# Patient Record
Sex: FEMALE | Race: WHITE | Employment: OTHER | URBAN - METROPOLITAN AREA
[De-identification: names, ages, dates, MRNs, and addresses within clinical notes are randomized per-mention and may not be internally consistent; named-entity substitution may affect disease eponyms.]

---

## 2017-08-25 ENCOUNTER — HOSPITAL ENCOUNTER (OUTPATIENT)
Dept: GENERAL RADIOLOGY | Age: 82
Discharge: HOME | End: 2017-08-25
Attending: INTERNAL MEDICINE
Payer: MEDICARE

## 2017-08-25 DIAGNOSIS — R63.4 WEIGHT LOSS: ICD-10-CM

## 2017-08-25 PROCEDURE — 71020 XR CHEST PA LAT: CPT

## 2022-12-10 ENCOUNTER — APPOINTMENT (OUTPATIENT)
Dept: VASCULAR SURGERY | Age: 87
DRG: 683 | End: 2022-12-10
Attending: INTERNAL MEDICINE
Payer: MEDICARE

## 2022-12-10 ENCOUNTER — APPOINTMENT (OUTPATIENT)
Dept: GENERAL RADIOLOGY | Age: 87
DRG: 683 | End: 2022-12-10
Attending: EMERGENCY MEDICINE
Payer: MEDICARE

## 2022-12-10 ENCOUNTER — HOSPITAL ENCOUNTER (INPATIENT)
Age: 87
LOS: 4 days | Discharge: NURSING, REHAB OR LONG-TERM CARE | DRG: 683 | End: 2022-12-14
Attending: EMERGENCY MEDICINE | Admitting: INTERNAL MEDICINE
Payer: MEDICARE

## 2022-12-10 DIAGNOSIS — J98.01 ACUTE BRONCHOSPASM: ICD-10-CM

## 2022-12-10 DIAGNOSIS — R06.00 DYSPNEA, UNSPECIFIED TYPE: Primary | ICD-10-CM

## 2022-12-10 DIAGNOSIS — R60.0 PEDAL EDEMA: ICD-10-CM

## 2022-12-10 LAB
ALBUMIN SERPL-MCNC: 3 G/DL (ref 3.5–5)
ALBUMIN/GLOB SERPL: 1 (ref 1.1–2.2)
ALP SERPL-CCNC: 45 U/L (ref 45–117)
ALT SERPL-CCNC: 18 U/L (ref 12–78)
ANION GAP SERPL CALC-SCNC: 7 MMOL/L (ref 5–15)
APPEARANCE UR: CLEAR
APPEARANCE UR: CLEAR
AST SERPL-CCNC: 28 U/L (ref 15–37)
B PERT DNA SPEC QL NAA+PROBE: NOT DETECTED
BACTERIA URNS QL MICRO: NEGATIVE /HPF
BASOPHILS # BLD: 0 K/UL (ref 0–0.1)
BASOPHILS NFR BLD: 0 % (ref 0–1)
BILIRUB SERPL-MCNC: 0.5 MG/DL (ref 0.2–1)
BILIRUB UR QL: NEGATIVE
BILIRUB UR QL: NEGATIVE
BNP SERPL-MCNC: 1753 PG/ML
BORDETELLA PARAPERTUSSIS PCR, BORPAR: NOT DETECTED
BUN SERPL-MCNC: 33 MG/DL (ref 6–20)
BUN/CREAT SERPL: 28 (ref 12–20)
C PNEUM DNA SPEC QL NAA+PROBE: NOT DETECTED
CALCIUM SERPL-MCNC: 7.7 MG/DL (ref 8.5–10.1)
CHLORIDE SERPL-SCNC: 106 MMOL/L (ref 97–108)
CO2 SERPL-SCNC: 24 MMOL/L (ref 21–32)
COLOR UR: NORMAL
COLOR UR: NORMAL
COVID-19 RAPID TEST, COVR: NOT DETECTED
CREAT SERPL-MCNC: 1.16 MG/DL (ref 0.55–1.02)
D DIMER PPP FEU-MCNC: 18.72 MG/L FEU (ref 0–0.65)
DIFFERENTIAL METHOD BLD: ABNORMAL
EOSINOPHIL # BLD: 0.1 K/UL (ref 0–0.4)
EOSINOPHIL NFR BLD: 1 % (ref 0–7)
EPITH CASTS URNS QL MICRO: NORMAL /LPF
ERYTHROCYTE [DISTWIDTH] IN BLOOD BY AUTOMATED COUNT: 13.9 % (ref 11.5–14.5)
FLUAV SUBTYP SPEC NAA+PROBE: NOT DETECTED
FLUBV RNA SPEC QL NAA+PROBE: NOT DETECTED
FOLATE SERPL-MCNC: 19.5 NG/ML (ref 5–21)
GLOBULIN SER CALC-MCNC: 3 G/DL (ref 2–4)
GLUCOSE SERPL-MCNC: 79 MG/DL (ref 65–100)
GLUCOSE UR STRIP.AUTO-MCNC: NEGATIVE MG/DL
GLUCOSE UR STRIP.AUTO-MCNC: NEGATIVE MG/DL
HADV DNA SPEC QL NAA+PROBE: NOT DETECTED
HCOV 229E RNA SPEC QL NAA+PROBE: NOT DETECTED
HCOV HKU1 RNA SPEC QL NAA+PROBE: NOT DETECTED
HCOV NL63 RNA SPEC QL NAA+PROBE: NOT DETECTED
HCOV OC43 RNA SPEC QL NAA+PROBE: NOT DETECTED
HCT VFR BLD AUTO: 30 % (ref 35–47)
HGB BLD-MCNC: 9.5 G/DL (ref 11.5–16)
HGB UR QL STRIP: NEGATIVE
HGB UR QL STRIP: NEGATIVE
HMPV RNA SPEC QL NAA+PROBE: NOT DETECTED
HPIV1 RNA SPEC QL NAA+PROBE: NOT DETECTED
HPIV2 RNA SPEC QL NAA+PROBE: NOT DETECTED
HPIV3 RNA SPEC QL NAA+PROBE: NOT DETECTED
HPIV4 RNA SPEC QL NAA+PROBE: NOT DETECTED
HYALINE CASTS URNS QL MICRO: NORMAL /LPF (ref 0–2)
IMM GRANULOCYTES # BLD AUTO: 0 K/UL (ref 0–0.04)
IMM GRANULOCYTES NFR BLD AUTO: 1 % (ref 0–0.5)
KETONES UR QL STRIP.AUTO: NEGATIVE MG/DL
KETONES UR QL STRIP.AUTO: NEGATIVE MG/DL
LACTATE BLD-SCNC: 1.22 MMOL/L (ref 0.4–2)
LEUKOCYTE ESTERASE UR QL STRIP.AUTO: NEGATIVE
LEUKOCYTE ESTERASE UR QL STRIP.AUTO: NEGATIVE
LYMPHOCYTES # BLD: 1.7 K/UL (ref 0.8–3.5)
LYMPHOCYTES NFR BLD: 23 % (ref 12–49)
M PNEUMO DNA SPEC QL NAA+PROBE: NOT DETECTED
MCH RBC QN AUTO: 32.4 PG (ref 26–34)
MCHC RBC AUTO-ENTMCNC: 31.7 G/DL (ref 30–36.5)
MCV RBC AUTO: 102.4 FL (ref 80–99)
MONOCYTES # BLD: 0.8 K/UL (ref 0–1)
MONOCYTES NFR BLD: 10 % (ref 5–13)
NEUTS SEG # BLD: 4.9 K/UL (ref 1.8–8)
NEUTS SEG NFR BLD: 65 % (ref 32–75)
NITRITE UR QL STRIP.AUTO: NEGATIVE
NITRITE UR QL STRIP.AUTO: NEGATIVE
NRBC # BLD: 0 K/UL (ref 0–0.01)
NRBC BLD-RTO: 0 PER 100 WBC
PH UR STRIP: 5.5 (ref 5–8)
PH UR STRIP: 6.5 (ref 5–8)
PLATELET # BLD AUTO: 161 K/UL (ref 150–400)
PMV BLD AUTO: 12.2 FL (ref 8.9–12.9)
POTASSIUM SERPL-SCNC: 4.1 MMOL/L (ref 3.5–5.1)
PROT SERPL-MCNC: 6 G/DL (ref 6.4–8.2)
PROT UR STRIP-MCNC: NEGATIVE MG/DL
PROT UR STRIP-MCNC: NEGATIVE MG/DL
RBC # BLD AUTO: 2.93 M/UL (ref 3.8–5.2)
RBC #/AREA URNS HPF: NORMAL /HPF (ref 0–5)
RSV RNA SPEC QL NAA+PROBE: NOT DETECTED
RV+EV RNA SPEC QL NAA+PROBE: NOT DETECTED
SARS-COV-2 PCR, COVPCR: NOT DETECTED
SODIUM SERPL-SCNC: 137 MMOL/L (ref 136–145)
SOURCE, COVRS: NORMAL
SP GR UR REFRACTOMETRY: 1.01 (ref 1–1.03)
SP GR UR REFRACTOMETRY: 1.02 (ref 1–1.03)
TROPONIN-HIGH SENSITIVITY: 18 NG/L (ref 0–51)
TSH SERPL DL<=0.05 MIU/L-ACNC: 1.2 UIU/ML (ref 0.36–3.74)
UA: UC IF INDICATED,UAUC: NORMAL
UR CULT HOLD, URHOLD: NORMAL
UROBILINOGEN UR QL STRIP.AUTO: 0.2 EU/DL (ref 0.2–1)
UROBILINOGEN UR QL STRIP.AUTO: 0.2 EU/DL (ref 0.2–1)
VIT B12 SERPL-MCNC: 291 PG/ML (ref 193–986)
WBC # BLD AUTO: 7.5 K/UL (ref 3.6–11)
WBC URNS QL MICRO: NORMAL /HPF (ref 0–4)

## 2022-12-10 PROCEDURE — 96375 TX/PRO/DX INJ NEW DRUG ADDON: CPT

## 2022-12-10 PROCEDURE — 74011250636 HC RX REV CODE- 250/636: Performed by: INTERNAL MEDICINE

## 2022-12-10 PROCEDURE — 96365 THER/PROPH/DIAG IV INF INIT: CPT

## 2022-12-10 PROCEDURE — 71045 X-RAY EXAM CHEST 1 VIEW: CPT

## 2022-12-10 PROCEDURE — 94640 AIRWAY INHALATION TREATMENT: CPT

## 2022-12-10 PROCEDURE — 74011000250 HC RX REV CODE- 250

## 2022-12-10 PROCEDURE — 74011250636 HC RX REV CODE- 250/636: Performed by: EMERGENCY MEDICINE

## 2022-12-10 PROCEDURE — 84484 ASSAY OF TROPONIN QUANT: CPT

## 2022-12-10 PROCEDURE — 74011250637 HC RX REV CODE- 250/637: Performed by: HOSPITALIST

## 2022-12-10 PROCEDURE — 82607 VITAMIN B-12: CPT

## 2022-12-10 PROCEDURE — 74011000250 HC RX REV CODE- 250: Performed by: INTERNAL MEDICINE

## 2022-12-10 PROCEDURE — 84443 ASSAY THYROID STIM HORMONE: CPT

## 2022-12-10 PROCEDURE — 94664 DEMO&/EVAL PT USE INHALER: CPT

## 2022-12-10 PROCEDURE — 80053 COMPREHEN METABOLIC PANEL: CPT

## 2022-12-10 PROCEDURE — 85379 FIBRIN DEGRADATION QUANT: CPT

## 2022-12-10 PROCEDURE — 93005 ELECTROCARDIOGRAM TRACING: CPT

## 2022-12-10 PROCEDURE — 81003 URINALYSIS AUTO W/O SCOPE: CPT

## 2022-12-10 PROCEDURE — 65270000029 HC RM PRIVATE

## 2022-12-10 PROCEDURE — 83605 ASSAY OF LACTIC ACID: CPT

## 2022-12-10 PROCEDURE — 82746 ASSAY OF FOLIC ACID SERUM: CPT

## 2022-12-10 PROCEDURE — 99285 EMERGENCY DEPT VISIT HI MDM: CPT

## 2022-12-10 PROCEDURE — 74011250637 HC RX REV CODE- 250/637: Performed by: INTERNAL MEDICINE

## 2022-12-10 PROCEDURE — 93970 EXTREMITY STUDY: CPT

## 2022-12-10 PROCEDURE — 87635 SARS-COV-2 COVID-19 AMP PRB: CPT

## 2022-12-10 PROCEDURE — 85025 COMPLETE CBC W/AUTO DIFF WBC: CPT

## 2022-12-10 PROCEDURE — 36415 COLL VENOUS BLD VENIPUNCTURE: CPT

## 2022-12-10 PROCEDURE — 83880 ASSAY OF NATRIURETIC PEPTIDE: CPT

## 2022-12-10 PROCEDURE — 81001 URINALYSIS AUTO W/SCOPE: CPT

## 2022-12-10 PROCEDURE — 0202U NFCT DS 22 TRGT SARS-COV-2: CPT

## 2022-12-10 PROCEDURE — 51798 US URINE CAPACITY MEASURE: CPT

## 2022-12-10 RX ORDER — SODIUM CHLORIDE 0.9 % (FLUSH) 0.9 %
5-40 SYRINGE (ML) INJECTION EVERY 8 HOURS
Status: DISCONTINUED | OUTPATIENT
Start: 2022-12-10 | End: 2022-12-14 | Stop reason: HOSPADM

## 2022-12-10 RX ORDER — IPRATROPIUM BROMIDE AND ALBUTEROL SULFATE 2.5; .5 MG/3ML; MG/3ML
3 SOLUTION RESPIRATORY (INHALATION)
Status: DISCONTINUED | OUTPATIENT
Start: 2022-12-10 | End: 2022-12-10

## 2022-12-10 RX ORDER — CITALOPRAM 20 MG/1
20 TABLET, FILM COATED ORAL DAILY
Status: DISCONTINUED | OUTPATIENT
Start: 2022-12-11 | End: 2022-12-14 | Stop reason: HOSPADM

## 2022-12-10 RX ORDER — ONDANSETRON 4 MG/1
4 TABLET, ORALLY DISINTEGRATING ORAL
Status: DISCONTINUED | OUTPATIENT
Start: 2022-12-10 | End: 2022-12-14 | Stop reason: HOSPADM

## 2022-12-10 RX ORDER — DONEPEZIL HYDROCHLORIDE 5 MG/1
10 TABLET, FILM COATED ORAL
Status: DISCONTINUED | OUTPATIENT
Start: 2022-12-11 | End: 2022-12-14 | Stop reason: HOSPADM

## 2022-12-10 RX ORDER — FUROSEMIDE 10 MG/ML
40 INJECTION INTRAMUSCULAR; INTRAVENOUS ONCE
Status: COMPLETED | OUTPATIENT
Start: 2022-12-10 | End: 2022-12-10

## 2022-12-10 RX ORDER — MAGNESIUM SULFATE HEPTAHYDRATE 40 MG/ML
2 INJECTION, SOLUTION INTRAVENOUS ONCE
Status: COMPLETED | OUTPATIENT
Start: 2022-12-10 | End: 2022-12-10

## 2022-12-10 RX ORDER — ACETAMINOPHEN 325 MG/1
650 TABLET ORAL
Status: DISCONTINUED | OUTPATIENT
Start: 2022-12-10 | End: 2022-12-14 | Stop reason: HOSPADM

## 2022-12-10 RX ORDER — FUROSEMIDE 10 MG/ML
40 INJECTION INTRAMUSCULAR; INTRAVENOUS DAILY
Status: DISCONTINUED | OUTPATIENT
Start: 2022-12-11 | End: 2022-12-11

## 2022-12-10 RX ORDER — GUAIFENESIN 600 MG/1
1200 TABLET, EXTENDED RELEASE ORAL EVERY 12 HOURS
Status: DISCONTINUED | OUTPATIENT
Start: 2022-12-10 | End: 2022-12-11

## 2022-12-10 RX ORDER — ONDANSETRON 2 MG/ML
4 INJECTION INTRAMUSCULAR; INTRAVENOUS
Status: DISCONTINUED | OUTPATIENT
Start: 2022-12-10 | End: 2022-12-11

## 2022-12-10 RX ORDER — ARFORMOTEROL TARTRATE 15 UG/2ML
15 SOLUTION RESPIRATORY (INHALATION)
Status: DISCONTINUED | OUTPATIENT
Start: 2022-12-10 | End: 2022-12-11

## 2022-12-10 RX ORDER — ALBUTEROL SULFATE 0.83 MG/ML
2.5 SOLUTION RESPIRATORY (INHALATION)
Status: ACTIVE | OUTPATIENT
Start: 2022-12-10 | End: 2022-12-11

## 2022-12-10 RX ORDER — ENOXAPARIN SODIUM 100 MG/ML
30 INJECTION SUBCUTANEOUS DAILY
Status: DISCONTINUED | OUTPATIENT
Start: 2022-12-11 | End: 2022-12-10

## 2022-12-10 RX ORDER — LEVOTHYROXINE SODIUM 100 UG/1
100 TABLET ORAL
Status: DISCONTINUED | OUTPATIENT
Start: 2022-12-11 | End: 2022-12-14 | Stop reason: HOSPADM

## 2022-12-10 RX ORDER — BUDESONIDE 0.5 MG/2ML
500 INHALANT ORAL
Status: DISCONTINUED | OUTPATIENT
Start: 2022-12-10 | End: 2022-12-11

## 2022-12-10 RX ORDER — SODIUM CHLORIDE 0.9 % (FLUSH) 0.9 %
5-40 SYRINGE (ML) INJECTION AS NEEDED
Status: DISCONTINUED | OUTPATIENT
Start: 2022-12-10 | End: 2022-12-14 | Stop reason: HOSPADM

## 2022-12-10 RX ORDER — IPRATROPIUM BROMIDE AND ALBUTEROL SULFATE 2.5; .5 MG/3ML; MG/3ML
3 SOLUTION RESPIRATORY (INHALATION)
Status: DISCONTINUED | OUTPATIENT
Start: 2022-12-11 | End: 2022-12-11

## 2022-12-10 RX ORDER — POLYETHYLENE GLYCOL 3350 17 G/17G
17 POWDER, FOR SOLUTION ORAL DAILY PRN
Status: DISCONTINUED | OUTPATIENT
Start: 2022-12-10 | End: 2022-12-14 | Stop reason: HOSPADM

## 2022-12-10 RX ORDER — ENOXAPARIN SODIUM 100 MG/ML
1 INJECTION SUBCUTANEOUS EVERY 24 HOURS
Status: DISCONTINUED | OUTPATIENT
Start: 2022-12-10 | End: 2022-12-11

## 2022-12-10 RX ORDER — ALPRAZOLAM 0.5 MG/1
0.5 TABLET ORAL
Status: COMPLETED | OUTPATIENT
Start: 2022-12-10 | End: 2022-12-10

## 2022-12-10 RX ORDER — IPRATROPIUM BROMIDE AND ALBUTEROL SULFATE 2.5; .5 MG/3ML; MG/3ML
SOLUTION RESPIRATORY (INHALATION)
Status: COMPLETED
Start: 2022-12-10 | End: 2022-12-10

## 2022-12-10 RX ORDER — ACETAMINOPHEN 650 MG/1
650 SUPPOSITORY RECTAL
Status: DISCONTINUED | OUTPATIENT
Start: 2022-12-10 | End: 2022-12-11

## 2022-12-10 RX ADMIN — MAGNESIUM SULFATE HEPTAHYDRATE 2 G: 40 INJECTION, SOLUTION INTRAVENOUS at 12:52

## 2022-12-10 RX ADMIN — FUROSEMIDE 40 MG: 10 INJECTION, SOLUTION INTRAMUSCULAR; INTRAVENOUS at 13:19

## 2022-12-10 RX ADMIN — METHYLPREDNISOLONE SODIUM SUCCINATE 40 MG: 40 INJECTION, POWDER, FOR SOLUTION INTRAMUSCULAR; INTRAVENOUS at 17:54

## 2022-12-10 RX ADMIN — IPRATROPIUM BROMIDE AND ALBUTEROL SULFATE: .5; 3 SOLUTION RESPIRATORY (INHALATION) at 12:50

## 2022-12-10 RX ADMIN — Medication 10 ML: at 14:00

## 2022-12-10 RX ADMIN — METHYLPREDNISOLONE SODIUM SUCCINATE 60 MG: 125 INJECTION, POWDER, FOR SOLUTION INTRAMUSCULAR; INTRAVENOUS at 12:50

## 2022-12-10 RX ADMIN — BUDESONIDE 500 MCG: 0.5 SUSPENSION RESPIRATORY (INHALATION) at 19:56

## 2022-12-10 RX ADMIN — ENOXAPARIN SODIUM 70 MG: 100 INJECTION SUBCUTANEOUS at 20:20

## 2022-12-10 RX ADMIN — ARFORMOTEROL TARTRATE 15 MCG: 15 SOLUTION RESPIRATORY (INHALATION) at 19:56

## 2022-12-10 RX ADMIN — ALPRAZOLAM 0.5 MG: 0.5 TABLET ORAL at 20:19

## 2022-12-10 RX ADMIN — IPRATROPIUM BROMIDE AND ALBUTEROL SULFATE 3 ML: .5; 3 SOLUTION RESPIRATORY (INHALATION) at 19:56

## 2022-12-10 RX ADMIN — GUAIFENESIN 1200 MG: 600 TABLET, EXTENDED RELEASE ORAL at 20:20

## 2022-12-10 RX ADMIN — GUAIFENESIN 1200 MG: 600 TABLET, EXTENDED RELEASE ORAL at 14:01

## 2022-12-10 NOTE — ED PROVIDER NOTES
80-year-old female presents emergency department chief complaint of increasing shortness of breath and leg swelling, as well as dysuria. Family at bedside states that she was discharged from [facility] December 2 for which she was admitted for UTI. They cannot recall her antibiotic at this time. Patient had started to develop wheezing around this time and she has been on a tapered dose of prednisone. Her prednisone was started approximately December 2 as well. She denies a history of asthma or COPD, she is reporting productive cough, chills, shortness of breath, increased orthopnea, increased bilateral lower leg swelling with \"leaking\". Daughter at bedside states that her mother has been more confused, she attributes some of this to the death of her brother last January. The history is provided by the patient, a relative and medical records. Shortness of Breath  This is a new problem. The current episode started more than 1 week ago. The problem has been gradually worsening. Associated symptoms include cough and leg swelling. Pertinent negatives include no fever, no chest pain and no abdominal pain. Leg Swelling   Pertinent negatives include no back pain.       Past Medical History:   Diagnosis Date    Diverticular disease of colon     Esophagitis     High cholesterol     HTN (hypertension)     IBS (irritable bowel syndrome)     Pneumonia     Psychiatric disorder     Anxiety    Thyroid disease     Hypothyroidism       Past Surgical History:   Procedure Laterality Date    ENDOSCOPY, COLON, DIAGNOSTIC  3/10    Tics        o/w  neg    HX CATARACT REMOVAL      HX CHOLECYSTECTOMY      HX GYN  1985    hysterectomy    HX KNEE REPLACEMENT  8/2012    Right    [place] ERCP W/SPHINCTEROTOMY/PAPILLOTOMY  6/01         Family History:   Problem Relation Age of Onset    Heart Disease Mother     Heart Disease Father     Heart Disease Brother     Heart Disease Brother        Social History     Socioeconomic History Marital status:      Spouse name: Not on file    Number of children: Not on file    Years of education: Not on file    Highest education level: Not on file   Occupational History    Not on file   Tobacco Use    Smoking status: Never    Smokeless tobacco: Never   Substance and Sexual Activity    Alcohol use: No    Drug use: No    Sexual activity: Not on file   Other Topics Concern    Not on file   Social History Narrative    Not on file     Social Determinants of Health     Financial Resource Strain: Not on file   Food Insecurity: Not on file   Transportation Needs: Not on file   Physical Activity: Not on file   Stress: Not on file   Social Connections: Not on file   Intimate Partner Violence: Not on file   Housing Stability: Not on file         ALLERGIES: Seroquel [quetiapine]    Review of Systems   Constitutional:  Positive for activity change, chills and fatigue. Negative for diaphoresis and fever. HENT:  Negative for sinus pain. Respiratory:  Positive for cough, chest tightness and shortness of breath. Cardiovascular:  Positive for leg swelling. Negative for chest pain and palpitations. Gastrointestinal:  Negative for abdominal pain. Genitourinary:  Positive for dysuria. Musculoskeletal:  Negative for back pain. Neurological:  Negative for dizziness, speech difficulty and light-headedness. Psychiatric/Behavioral:  Positive for confusion. All other systems reviewed and are negative. Vitals:    12/10/22 1143   BP: (!) 160/71   Pulse: (!) 55   Resp: 24   Temp: 98.3 °F (36.8 °C)   SpO2: 97%   Weight: 65.8 kg (145 lb)   Height: 5' 5\" (1.651 m)            Physical Exam  Vitals and nursing note reviewed. Constitutional:       Comments: Appears stated age, appears ill and in mild to moderate respiratory distress. Speaking 2-3 word sentences with pursed lip breathing. Patient is a poor historian but able to answer simple questions   HENT:      Head: Normocephalic and atraumatic.    Eyes: Extraocular Movements: Extraocular movements intact. Pupils: Pupils are equal, round, and reactive to light. Cardiovascular:      Rate and Rhythm: Normal rate. Comments: -year-old laterally displaced PMI, regular rate, systolic ejection murmur noted  Pulmonary:      Comments: Respiratory rate, mildly prolonged expiratory phase, patient speaking 2-3 word sentences, diffuse wheezing throughout, rales and rhonchi noted posterior right lower lobe  Abdominal:      General: Bowel sounds are normal.      Palpations: Abdomen is soft. Musculoskeletal:      Cervical back: Normal range of motion and neck supple. Right lower leg: Edema present. Left lower leg: Edema present. Comments: Moderate bilateral pedal edema extending proximately to proximal tib-fib, noted some blistering to left anterior lower tib-fib   Skin:     Coloration: Skin is pale. Neurological:      General: No focal deficit present. MDM  12:38 PM  Patient reports mild improvement after initial DuoNeb, has better lung excursion, continues to have wheezing. Reviewed chest x-ray at bedside, no obvious infiltrate, cardiomegaly is noted, consider congestive heart failure as source of  wheeze    EKG: large amount of artifact, rate 50, , Qtc 419, Poor R wave progression, LAD, no acute changes. CBC showed a white count of 7.5, hemoglobin 9.5, hematocrit 30.0, platelets of 953, CMP is as follows: Sodium 137, potassium 4.1, chloride 106, CO2 24, BUN slightly elevated at 33, creatinine slightly elevated 1.16, BNP is 1753 with a troponin of 18, urinalysis still pending, lactic acid negative    1:08 PM  Patient still appears short of breath and appears more shaky now, most likely secondary to 2 breathing treatments. Has some improvement in bilateral breath sounds although they sound more coarse now than wheezing.       72-year-old female presents emergency department with chief complaint difficulty breathing and wheezing. She was started on a prednisone taper pack as well as at home albuterol inhaler without relief around December 2..  Denies a history of smoking or COPD. She was recently discharged from [facility] after treatment of urinary tract infection. Chest x-ray showed cardiomegaly but was negative for any infiltrate, patient unable to stand for lateral view or to lay supine for CT. Lab work was largely within normal limits except for a slightly elevated BNP. Unknown etiology of very coarse breath sounds, consider a lateral infiltrate that cannot be seen on PA film, or PE due to recent hospitalization. We will hold anticoagulation at this time secondary to patient age and also presentation of viral bronchitis with bronchospasm. May have superimposed congestive heart failure. She was given 2 nebulizer treatments in the emergency department with some improvement of chest excursion and lung sounds. Patient states she started to feel better had improvement in skin color. She was also given magnesium 2 g, as well as Lasix 40 mg and recommend admission. COVID pending    Perfect Serve Consult for Admission  1:09 PM    ED Room Number: ER07/07  Patient Name and age: Bernadette Pina 80 y.o.  female  Working Diagnosis:   1. Dyspnea, unspecified type    2. Acute bronchospasm    3.  Pedal edema        COVID-19 Suspicion:  yes  Sepsis present:  no  Reassessment needed: no  Code Status:  Do Not Resuscitate  Readmission: no  Isolation Requirements:  Other  Recommended Level of Care:  telemetry  Department:[facility] ED - [phone]  Other:          Procedures

## 2022-12-10 NOTE — H&P
Alon Sena [facility] [address]  [address] [facility], [address] [place] [address]  [phone]    Admission History and Physical      NAME:  Jorge Gongora   :   1930   MRN:  [id number]     PCP:  Margarito Mejia MD     Date/Time of service:  12/10/2022  2:20 PM        Subjective:     CHIEF COMPLAINT: Dyspnea    HISTORY OF PRESENT ILLNESS:     Ms. Carey Green is a 80 y.o. female with a past medical history of dementia, hypertension, hyperlipidemia, IBS, thyroid disease, transient A. fib A. fib who is admitted with dyspnea. Ms. Carey Green is oriented to person and place but not time. She can provide some history but is limited; her daughter is at bedside and helps provide further information. Patient is admitted to [facility] at the end of November and has been home for the last 8 days. She has continued to experience dyspnea, cough and wheezing increased swelling and weeping from her legs. She does endorse some dysuria. No fevers or chills, cough or chest pain. No falls or syncope. Allergies   Allergen Reactions    Seroquel [Quetiapine] Other (comments)     agitation       Prior to Admission medications    Medication Sig Start Date End Date Taking? Authorizing Provider   donepeziL (ARICEPT) 10 mg tablet TAKE 1 TABLET EVERY NIGHT 22  Yes Margarito Mejia MD   levothyroxine (SYNTHROID) 112 mcg tablet TAKE 1 TABLET EVERY DAY BEFORE BREAKFAST  Patient taking differently: Take 100 mcg by mouth. 22  Yes Margarito Mejia MD   lisinopriL (PRINIVIL, ZESTRIL) 40 mg tablet TAKE 1 TABLET EVERY DAY 22  Yes Margarito Mejia MD   citalopram (CELEXA) 20 mg tablet TAKE 1 AND 1/2 TABLETS EVERY DAY 22  Yes Margarito Mejia MD   simvastatin (ZOCOR) 20 mg tablet TAKE 1 TABLET EVERY NIGHT 22  Yes Margarito Mejia MD   aspirin delayed-release 81 mg tablet Take  by mouth daily.       Provider, Historical       Past Medical History:   Diagnosis Date Diverticular disease of colon     Esophagitis     High cholesterol     HTN (hypertension)     IBS (irritable bowel syndrome)     Pneumonia     Psychiatric disorder     Anxiety    Thyroid disease     Hypothyroidism        Past Surgical History:   Procedure Laterality Date    ENDOSCOPY, COLON, DIAGNOSTIC  3/10    Tics        o/w  neg    HX CATARACT REMOVAL      HX CHOLECYSTECTOMY      HX GYN  1985    hysterectomy    HX KNEE REPLACEMENT  8/2012    Right    [place] ERCP W/SPHINCTEROTOMY/PAPILLOTOMY  6/01       Social History     Tobacco Use    Smoking status: Never    Smokeless tobacco: Never   Substance Use Topics    Alcohol use: No        Family History   Problem Relation Age of Onset    Heart Disease Mother     Heart Disease Father     Heart Disease Brother     Heart Disease Brother         Review of Systems:  (bold if positive, if negative)    Gen:  Eyes:  ENT:  CVS:  Pulm:  Cough, dyspnea, wheezing  GI:  :  MS:  weakness, swelling,Skin:  Psych:  Endo:  Hem:  Renal:  Neuro:            Objective:      VITALS:    Vital signs reviewed; most recent are:    Visit Vitals  BP (!) 160/71 (BP 1 Location: Left upper arm, BP Patient Position: Sitting)   Pulse (!) 51   Temp 98.3 °F (36.8 °C)   Resp 24   Ht 5' 5\" (1.651 m)   Wt 65.8 kg (145 lb)   SpO2 97%   BMI 24.13 kg/m²     SpO2 Readings from Last 6 Encounters:   12/10/22 97%   08/23/12 100%   08/08/12 98%        No intake or output data in the 24 hours ending 12/10/22 1420     Exam:     Physical Exam:    Gen: elderly, in no acute distress  HEENT:  Pink conjunctivae, PERRL, hearing intact to voice  Resp: Some accessory muscle use, rhonchi and rales bilaterally  Card:  RRR, No murmurs, normal S1, S2, bilateral lower extremity peripheral edema  Abd:  Soft, non-tender, non-distended, normoactive bowel sounds are present  Musc:  No cyanosis or clubbing  Skin:  No rashes or ulcers, skin turgor is good  Neuro:  Cranial nerves 3-12 are grossly intact, diffuse weakness, follows commands appropriately  Psych:  Oriented to person, place, but not time, poor insight      Labs:    Recent Labs     12/10/22  1150   WBC 7.5   HGB 9.5*   HCT 30.0*        Recent Labs     12/10/22  1150      K 4.1      CO2 24   GLU 79   BUN 33*   CREA 1.16*   CA 7.7*   ALB 3.0*   TBILI 0.5   ALT 18     No results found for: GLUCPOC  No results for input(s): PH, PCO2, PO2, HCO3, FIO2 in the last 72 hours. No results for input(s): INR, INREXT in the last 72 hours. Radiology and EKG reviewed:   Chest x-ray with no acute concerns     Old Records reviewed in [facility]       Assessment/Plan:       Dyspnea/wheezing POA: Concern for volume overload, respiratory virus or PE. Check respiratory viral panel. proBNP elevated on admission. Check echo; prior echo with systolic function on lower end however this was in 2012. Check D-dimer. Status post IV Lasix in the emergency room; continue as needed. Continue IV steroids for now. Scheduled DuoNebs, Brovana and Pulmicort. Scheduled Mucinex. Incentive spirometry. DEBORAH versus CKD POA: Unclear baseline. Check UA. Monitor renal function with IV Lasix. Hold ACE for now. Lower extremity swelling POA: Check D-dimer and lower extremity Dopplers. ** dopplers positive for LLE dvt. Start dose adjusted lovenox due to renal function     Dysuria/recent UTI POA: Check UA. Hypothyroidism: Check TSH. Continue Synthroid. Hyperlipidemia: Hold home statin. Dementia/depression: Resume home Aricept and Celexa.     Risk of deterioration: high      Total time spent with patient: 48 Minutes **I personally saw and examined the patient during this time period**                 Care Plan discussed with: Patient, Family, and Nursing Staff    Discussed:  Code Status and Care Plan    Prophylaxis:  Lovenox    Probable Disposition:  Home w/Family           ___________________________________________________    Attending Physician: Kris Murphy DO

## 2022-12-11 ENCOUNTER — APPOINTMENT (OUTPATIENT)
Dept: NON INVASIVE DIAGNOSTICS | Age: 87
DRG: 683 | End: 2022-12-11
Attending: INTERNAL MEDICINE
Payer: MEDICARE

## 2022-12-11 PROBLEM — K58.9 IBS (IRRITABLE BOWEL SYNDROME): Status: ACTIVE | Noted: 2022-12-11

## 2022-12-11 PROBLEM — E86.0 DEHYDRATION: Status: ACTIVE | Noted: 2022-12-11

## 2022-12-11 PROBLEM — R79.89 ELEVATED D-DIMER: Status: ACTIVE | Noted: 2022-12-11

## 2022-12-11 PROBLEM — F41.9 ANXIETY AND DEPRESSION: Status: ACTIVE | Noted: 2022-12-11

## 2022-12-11 PROBLEM — I82.409 ACUTE DEEP VENOUS THROMBOSIS (HCC): Status: ACTIVE | Noted: 2022-12-11

## 2022-12-11 PROBLEM — D69.6 THROMBOCYTOPENIA (HCC): Status: ACTIVE | Noted: 2022-12-11

## 2022-12-11 PROBLEM — N17.9 AKI (ACUTE KIDNEY INJURY) (HCC): Status: ACTIVE | Noted: 2022-12-11

## 2022-12-11 PROBLEM — F32.A ANXIETY AND DEPRESSION: Status: ACTIVE | Noted: 2022-12-11

## 2022-12-11 PROBLEM — D64.9 ANEMIA: Status: ACTIVE | Noted: 2022-12-11

## 2022-12-11 LAB
ANION GAP SERPL CALC-SCNC: 9 MMOL/L (ref 5–15)
BASOPHILS # BLD: 0 K/UL (ref 0–0.1)
BASOPHILS NFR BLD: 0 % (ref 0–1)
BUN SERPL-MCNC: 36 MG/DL (ref 6–20)
BUN/CREAT SERPL: 33 (ref 12–20)
CALCIUM SERPL-MCNC: 7.7 MG/DL (ref 8.5–10.1)
CHLORIDE SERPL-SCNC: 103 MMOL/L (ref 97–108)
CHOLEST SERPL-MCNC: 238 MG/DL
CO2 SERPL-SCNC: 25 MMOL/L (ref 21–32)
CREAT SERPL-MCNC: 1.09 MG/DL (ref 0.55–1.02)
DIFFERENTIAL METHOD BLD: ABNORMAL
ECHO AO ASC DIAM: 3.5 CM
ECHO AO ASCENDING AORTA INDEX: 2.02 CM/M2
ECHO AR MAX VEL PISA: 3.3 M/S
ECHO AV AREA PEAK VELOCITY: 1.6 CM2
ECHO AV AREA VTI: 2 CM2
ECHO AV AREA/BSA PEAK VELOCITY: 0.9 CM2/M2
ECHO AV AREA/BSA VTI: 1.2 CM2/M2
ECHO AV MEAN GRADIENT: 6 MMHG
ECHO AV MEAN VELOCITY: 1.2 M/S
ECHO AV PEAK GRADIENT: 13 MMHG
ECHO AV PEAK VELOCITY: 1.8 M/S
ECHO AV REGURGITANT PHT: 421.7 MILLISECOND
ECHO AV VELOCITY RATIO: 0.5
ECHO AV VTI: 35.9 CM
ECHO LA DIAMETER INDEX: 2.6 CM/M2
ECHO LA DIAMETER: 4.5 CM
ECHO LA VOL 2C: 50 ML (ref 22–52)
ECHO LA VOL 4C: 52 ML (ref 22–52)
ECHO LA VOL BP: 54 ML (ref 22–52)
ECHO LA VOL/BSA BIPLANE: 31 ML/M2 (ref 16–34)
ECHO LA VOLUME AREA LENGTH: 58 ML
ECHO LA VOLUME INDEX A2C: 29 ML/M2 (ref 16–34)
ECHO LA VOLUME INDEX A4C: 30 ML/M2 (ref 16–34)
ECHO LA VOLUME INDEX AREA LENGTH: 34 ML/M2 (ref 16–34)
ECHO LV E' LATERAL VELOCITY: 10 CM/S
ECHO LV E' SEPTAL VELOCITY: 6 CM/S
ECHO LV EDV A4C: 91 ML
ECHO LV EDV INDEX A4C: 53 ML/M2
ECHO LVOT AREA: 3.1 CM2
ECHO LVOT AV VTI INDEX: 0.63
ECHO LVOT DIAM: 2 CM
ECHO LVOT MEAN GRADIENT: 2 MMHG
ECHO LVOT PEAK GRADIENT: 4 MMHG
ECHO LVOT PEAK VELOCITY: 0.9 M/S
ECHO LVOT STROKE VOLUME INDEX: 41.2 ML/M2
ECHO LVOT SV: 71.3 ML
ECHO LVOT VTI: 22.7 CM
ECHO MV A VELOCITY: 0.82 M/S
ECHO MV E DECELERATION TIME (DT): 338.6 MS
ECHO MV E VELOCITY: 0.49 M/S
ECHO MV E/A RATIO: 0.6
ECHO MV E/E' LATERAL: 4.9
ECHO MV E/E' RATIO (AVERAGED): 6.53
ECHO MV E/E' SEPTAL: 8.17
ECHO MV REGURGITANT PEAK GRADIENT: 71 MMHG
ECHO MV REGURGITANT PEAK VELOCITY: 4.2 M/S
ECHO PV MAX VELOCITY: 0.9 M/S
ECHO PV PEAK GRADIENT: 3 MMHG
ECHO RV INTERNAL DIMENSION: 4.1 CM
ECHO RV TAPSE: 2.2 CM (ref 1.7–?)
ECHO RVOT PEAK GRADIENT: 2 MMHG
ECHO RVOT PEAK VELOCITY: 0.8 M/S
ECHO TV REGURGITANT MAX VELOCITY: 2.67 M/S
ECHO TV REGURGITANT PEAK GRADIENT: 29 MMHG
EOSINOPHIL # BLD: 0 K/UL (ref 0–0.4)
EOSINOPHIL NFR BLD: 0 % (ref 0–7)
ERYTHROCYTE [DISTWIDTH] IN BLOOD BY AUTOMATED COUNT: 13.7 % (ref 11.5–14.5)
FERRITIN SERPL-MCNC: 49 NG/ML (ref 26–388)
GLUCOSE SERPL-MCNC: 258 MG/DL (ref 65–100)
HAPTOGLOB SERPL-MCNC: 162 MG/DL (ref 30–200)
HCT VFR BLD AUTO: 27.5 % (ref 35–47)
HDLC SERPL-MCNC: 97 MG/DL
HDLC SERPL: 2.5 (ref 0–5)
HGB BLD-MCNC: 8.9 G/DL (ref 11.5–16)
IMM GRANULOCYTES # BLD AUTO: 0 K/UL (ref 0–0.04)
IMM GRANULOCYTES NFR BLD AUTO: 1 % (ref 0–0.5)
IRON SATN MFR SERPL: 8 % (ref 20–50)
IRON SERPL-MCNC: 25 UG/DL (ref 35–150)
LDH SERPL L TO P-CCNC: 225 U/L (ref 81–246)
LDLC SERPL CALC-MCNC: 128.4 MG/DL (ref 0–100)
LYMPHOCYTES # BLD: 0.3 K/UL (ref 0.8–3.5)
LYMPHOCYTES NFR BLD: 7 % (ref 12–49)
MAGNESIUM SERPL-MCNC: 2.5 MG/DL (ref 1.6–2.4)
MCH RBC QN AUTO: 32.1 PG (ref 26–34)
MCHC RBC AUTO-ENTMCNC: 32.4 G/DL (ref 30–36.5)
MCV RBC AUTO: 99.3 FL (ref 80–99)
MONOCYTES # BLD: 0 K/UL (ref 0–1)
MONOCYTES NFR BLD: 1 % (ref 5–13)
NEUTS SEG # BLD: 3.3 K/UL (ref 1.8–8)
NEUTS SEG NFR BLD: 91 % (ref 32–75)
NRBC # BLD: 0 K/UL (ref 0–0.01)
NRBC BLD-RTO: 0 PER 100 WBC
PLATELET # BLD AUTO: 130 K/UL (ref 150–400)
PMV BLD AUTO: 12 FL (ref 8.9–12.9)
POTASSIUM SERPL-SCNC: 3.9 MMOL/L (ref 3.5–5.1)
RBC # BLD AUTO: 2.77 M/UL (ref 3.8–5.2)
RBC MORPH BLD: ABNORMAL
RETICS # AUTO: 0.05 M/UL (ref 0.02–0.08)
RETICS/RBC NFR AUTO: 1.6 % (ref 0.7–2.1)
SODIUM SERPL-SCNC: 137 MMOL/L (ref 136–145)
TIBC SERPL-MCNC: 312 UG/DL (ref 250–450)
TRIGL SERPL-MCNC: 63 MG/DL (ref ?–150)
VLDLC SERPL CALC-MCNC: 12.6 MG/DL
WBC # BLD AUTO: 3.6 K/UL (ref 3.6–11)

## 2022-12-11 PROCEDURE — 80048 BASIC METABOLIC PNL TOTAL CA: CPT

## 2022-12-11 PROCEDURE — 65270000029 HC RM PRIVATE

## 2022-12-11 PROCEDURE — 74011250637 HC RX REV CODE- 250/637: Performed by: INTERNAL MEDICINE

## 2022-12-11 PROCEDURE — 93306 TTE W/DOPPLER COMPLETE: CPT

## 2022-12-11 PROCEDURE — 36415 COLL VENOUS BLD VENIPUNCTURE: CPT

## 2022-12-11 PROCEDURE — 74011000250 HC RX REV CODE- 250: Performed by: INTERNAL MEDICINE

## 2022-12-11 PROCEDURE — 83010 ASSAY OF HAPTOGLOBIN QUANT: CPT

## 2022-12-11 PROCEDURE — 93306 TTE W/DOPPLER COMPLETE: CPT | Performed by: INTERNAL MEDICINE

## 2022-12-11 PROCEDURE — 74011250636 HC RX REV CODE- 250/636: Performed by: INTERNAL MEDICINE

## 2022-12-11 PROCEDURE — 85025 COMPLETE CBC W/AUTO DIFF WBC: CPT

## 2022-12-11 PROCEDURE — 80061 LIPID PANEL: CPT

## 2022-12-11 PROCEDURE — 83615 LACTATE (LD) (LDH) ENZYME: CPT

## 2022-12-11 PROCEDURE — 82728 ASSAY OF FERRITIN: CPT

## 2022-12-11 PROCEDURE — 83540 ASSAY OF IRON: CPT

## 2022-12-11 PROCEDURE — 83735 ASSAY OF MAGNESIUM: CPT

## 2022-12-11 PROCEDURE — 51798 US URINE CAPACITY MEASURE: CPT

## 2022-12-11 PROCEDURE — 85045 AUTOMATED RETICULOCYTE COUNT: CPT

## 2022-12-11 RX ORDER — ARFORMOTEROL TARTRATE 15 UG/2ML
15 SOLUTION RESPIRATORY (INHALATION)
Status: DISCONTINUED | OUTPATIENT
Start: 2022-12-11 | End: 2022-12-11

## 2022-12-11 RX ORDER — BUDESONIDE 0.5 MG/2ML
500 INHALANT ORAL
Status: DISCONTINUED | OUTPATIENT
Start: 2022-12-11 | End: 2022-12-11

## 2022-12-11 RX ORDER — IPRATROPIUM BROMIDE AND ALBUTEROL SULFATE 2.5; .5 MG/3ML; MG/3ML
3 SOLUTION RESPIRATORY (INHALATION)
Status: DISCONTINUED | OUTPATIENT
Start: 2022-12-11 | End: 2022-12-14 | Stop reason: HOSPADM

## 2022-12-11 RX ADMIN — LEVOTHYROXINE SODIUM 100 MCG: 0.1 TABLET ORAL at 06:59

## 2022-12-11 RX ADMIN — METHYLPREDNISOLONE SODIUM SUCCINATE 40 MG: 40 INJECTION, POWDER, FOR SOLUTION INTRAMUSCULAR; INTRAVENOUS at 00:23

## 2022-12-11 RX ADMIN — DONEPEZIL HYDROCHLORIDE 10 MG: 5 TABLET, FILM COATED ORAL at 21:15

## 2022-12-11 RX ADMIN — Medication 10 ML: at 00:23

## 2022-12-11 RX ADMIN — Medication 10 ML: at 07:00

## 2022-12-11 RX ADMIN — CITALOPRAM HYDROBROMIDE 20 MG: 20 TABLET ORAL at 08:31

## 2022-12-11 RX ADMIN — GUAIFENESIN 1200 MG: 600 TABLET, EXTENDED RELEASE ORAL at 08:31

## 2022-12-11 RX ADMIN — APIXABAN 2.5 MG: 2.5 TABLET, FILM COATED ORAL at 17:00

## 2022-12-11 RX ADMIN — Medication 10 ML: at 21:17

## 2022-12-11 RX ADMIN — FUROSEMIDE 40 MG: 10 INJECTION, SOLUTION INTRAMUSCULAR; INTRAVENOUS at 08:31

## 2022-12-11 RX ADMIN — METHYLPREDNISOLONE SODIUM SUCCINATE 40 MG: 40 INJECTION, POWDER, FOR SOLUTION INTRAMUSCULAR; INTRAVENOUS at 06:59

## 2022-12-11 RX ADMIN — ACETAMINOPHEN 650 MG: 325 TABLET, FILM COATED ORAL at 21:15

## 2022-12-11 RX ADMIN — Medication 10 ML: at 17:00

## 2022-12-11 NOTE — PROGRESS NOTES
1930 Patient received in room 402, states she is afraid being alone in the room. Patient is moved to 417 close to nurses station. Patient reassured. Doors kept open. 2000 Patient is still anxious and agitated. Crying and shaking and verbalized she is afraid of being alone in the room still. Patient reassured again and kept her doors and blinds open. Sent a message to On call Doctor on duty via perfect serv. Late entry  At 2030 patient passed urine 100ml. No complaints of pain. At 0000 Patient stable and not in distress. verbalized she wants to pee. Passed 350 ml urine, bladder scan done noted 518 ml in the bladder. at [address] on [address] Oly Medeiros, to insert lockhart catheter. At 0100 Lockhart catheter inserted   At 0200 patient noted calm and sleeping, kept undisturbed. At 0600 patient calm, not in distress. At 0700 Handed over accordingly accordingly. Bedside shift change report given to Rukhsana Gant (oncoming nurse) by Jarocho Lawrence (offgoing nurse). Report included the following information SBAR, Kardex, Intake/Output, MAR, and Recent Results.

## 2022-12-11 NOTE — PROGRESS NOTES
12/11/22  1:48 PM    Care Management Initial Evaluation:    Reason for Admission:     1. Dyspnea, unspecified type    2. Acute bronchospasm    3. Pedal edema                       RUR Score:    16%  Level: Moderate              PCP: First and Last name:   Rashad Stover MD     Name of Practice:    Are you a current patient: Yes/No: Yes   Approximate date of last visit: March 2022   Can you participate in a virtual visit if needed:     Do you (patient/family) have any concerns for transition/discharge? Patient is not safe to live alone, has been staying at her son and DIL's home since being released from [facility] on 11/20/22. They have 13 interior steps they have to take her up and down to get to the living spaces and it has not been going well as she is weak. Her insurance denied IPR while at [facility] and the family appealed but in the meantime she was discharged and she could not then try to get authorization to go to SNF. Family feels that she would benefit greatly from rehab before going either home or back to their home where they could then care for her safely if she is not as weak as she currently is. Plan for utilizing home health:  No home health history     Current Advanced Directive/Advance Care Plan:  Full Code      Healthcare Decision Maker:   Click here to complete [address] including selection of the Healthcare Decision Maker Relationship (ie \"Primary\")   Jenn Brooke: Son- [phone]         Transition of Care Plan:        Patient had been living alone and managing until a recent hospital stay from a UTI. Her home is one level with 1 or 2 entry steps. She was discharged and she started staying with her son and daughter in law. It has been very challenging for them as she is weak and they are having a hard time managing her care at home. They prefer rehab at dc if PT and OT agree and would like preliminary referrals to [facility] and [facility].  They hope to be able to get her strong enough to bring her back home after rehab. Patient has a RW- two one with two wheels and one with four wheels    1),. Patient admitted for emergent care  2). PT/OT consulted  3). Transportation- family  4). CM following for dc needs- preliminary referrals to [facility] and [address] [place] [address] Management Interventions  PCP Verified by CM: Yes (March 2022)  Mode of Transport at Discharge:  Other (see comment) (son will transport at [email or web address]  Transition of Care Consult (CM Consult): Discharge Planning  Discharge Durable Medical Equipment: No  Physical Therapy Consult: Yes  Occupational Therapy Consult: Yes  Speech Therapy Consult: No  Support Systems: Child(edilia), Other Family Member(s)  Confirm Follow Up Transport: Family  The Patient and/or Patient Representative was Provided with a Choice of Provider and Agrees with the Discharge Plan?: Yes  Name of the Patient Representative Who was Provided with a Choice of Provider and Agrees with the Discharge Plan: son and patient  Freedom of Choice List was Provided with Basic Dialogue that Supports the Patient's Individualized Plan of Care/Goals, Treatment Preferences and Shares the Quality Data Associated with the Providers?: Yes  Discharge Location  Patient Expects to be Discharged to[de-identified] Skilled nursing facility

## 2022-12-11 NOTE — PROGRESS NOTES
Problem: Falls - Risk of  Goal: *Absence of Falls  Description: Document Sofia Fothergill Fall Risk and appropriate interventions in the flowsheet. Outcome: Progressing Towards Goal  Note: Fall Risk Interventions:  Mobility Interventions: Bed/chair exit alarm, OT consult for ADLs, Patient to call before getting OOB, PT Consult for mobility concerns, PT Consult for assist device competence, Utilize walker, cane, or other assistive device, Utilize gait belt for transfers/ambulation    Mentation Interventions: Adequate sleep, hydration, pain control, Bed/chair exit alarm, Door open when patient unattended, Evaluate medications/consider consulting pharmacy, Increase mobility, More frequent rounding, Reorient patient, Room close to nurse's station    Medication Interventions: Bed/chair exit alarm, Evaluate medications/consider consulting pharmacy, Patient to call before getting OOB, Teach patient to arise slowly, Utilize gait belt for transfers/ambulation    Elimination Interventions: Bed/chair exit alarm, Call light in reach, Patient to call for help with toileting needs, Toileting schedule/hourly rounds              Problem: Patient Education: Go to Patient Education Activity  Goal: Patient/Family Education  Outcome: Progressing Towards Goal     Problem: Pressure Injury - Risk of  Goal: *Prevention of pressure injury  Description: Document Roberto Scale and appropriate interventions in the flowsheet. Outcome: Progressing Towards Goal  Note: Pressure Injury Interventions:  Sensory Interventions: Assess changes in LOC, Float heels, Keep linens dry and wrinkle-free, Maintain/enhance activity level, Minimize linen layers, Monitor skin under medical devices, Turn and reposition approx.  every two hours (pillows and wedges if needed)    Moisture Interventions: Absorbent underpads, Internal/External urinary devices, Check for incontinence Q2 hours and as needed, Offer toileting Q_hr, Minimize layers    Activity Interventions: Increase time out of bed, PT/OT evaluation    Mobility Interventions: HOB 30 degrees or less, PT/OT evaluation, Turn and reposition approx.  every two hours(pillow and wedges), Assess need for specialty bed    Nutrition Interventions: Document food/fluid/supplement intake                     Problem: Patient Education: Go to Patient Education Activity  Goal: Patient/Family Education  Outcome: Progressing Towards Goal

## 2022-12-11 NOTE — PROGRESS NOTES
[facility]  [address], [facility]  [phone]    Hospitalist Progress Note      NAME: Tamara Arana   :  1930  MRM:  [id number]    Date/Time of service 2022  12:26 AM          Assessment and Plan:     Dyspnea - POA, unclear etiology. DDX URI, anemia, debility, anxiety, PE. No SIRS. No hypoxia. Clear lungs on xray. Negative RVP, Flu and COVID. Check oxygen requirement with PT      DEBORAH (acute kidney injury) / Dehydration - POA presume due to poor PO intake. Hydrate. Acute deep venous thrombosis / Elevated d-dimer - POA, new, no hemodynamic compromise or hypoxia. US finds one small DVT in L peroneal vein. VQ scan to check lungs. If low prob, I would not recommend aggressive treatment. Otherwise low dose eliquis. Debilitated patient - Worse since admission weeks ago. Needs HH PT OT. CM consulted. Anemia / Thrombocytopenia - Check serologies and hemoccult. HTN (hypertension) - BP low normal. Stop lisinopril    Hypothyroid - TSH normal. Continue synthroid    Dementia / Anxiety and depression / IBS (irritable bowel syndrome) - POA, stable. Continue donepazil and citalopram       Subjective:     Chief Complaint:  weak    ROS:  (bold if positive, if negative)    SOB/DOETolerating some PT  Tolerating Diet        Objective:     Last 24hrs VS reviewed since prior progress note.  Most recent are:    Visit Vitals  BP (!) 167/65   Pulse (!) 57   Temp 97.4 °F (36.3 °C)   Resp 18   Ht 5' 5\" (1.651 m)   Wt 65.8 kg (145 lb)   SpO2 94%   BMI 24.13 kg/m²     SpO2 Readings from Last 6 Encounters:   22 94%   12 100%   12 98%          Intake/Output Summary (Last 24 hours) at 2022 0918  Last data filed at 2022 0558  Gross per 24 hour   Intake 360 ml   Output 4769 ml   Net -4409 ml        Physical Exam:    Gen:  Well-developed, well-nourished, in no acute distress  HEENT:  Pink conjunctivae, PERRL, hearing intact to voice, moist mucous membranes  Neck:  Supple, without masses, thyroid non-tender  Resp:  No accessory muscle use, clear breath sounds without wheezes rales or rhonchi  Card:  No murmurs, normal S1, S2 without thrills, bruits or peripheral edema  Abd:  Soft, non-tender, non-distended, normoactive bowel sounds are present, no mass  Lymph:  No cervical or inguinal adenopathy  Musc:  No cyanosis or clubbing  Skin:  No rashes or ulcers, skin turgor is good  Neuro:  Cranial nerves are grossly intact, general motor weakness, follows commands   Psych:  Poor insight, oriented to person, place and time, anxiety    Telemetry reviewed:   normal sinus rhythm  __________________________________________________________________  Medications Reviewed: (see below)  Medications:     Current Facility-Administered Medications   Medication Dose Route Frequency    albuterol-ipratropium (DUO-NEB) 2.5 MG-0.5 MG/3 ML  3 mL Nebulization Q6H PRN    sodium chloride (NS) flush 5-40 mL  5-40 mL IntraVENous Q8H    sodium chloride (NS) flush 5-40 mL  5-40 mL IntraVENous PRN    acetaminophen (TYLENOL) tablet 650 mg  650 mg Oral Q6H PRN    polyethylene glycol (MIRALAX) packet 17 g  17 g Oral DAILY PRN    ondansetron (ZOFRAN ODT) tablet 4 mg  4 mg Oral Q8H PRN    citalopram (CELEXA) tablet 20 mg  20 mg Oral DAILY    donepeziL (ARICEPT) tablet 10 mg  10 mg Oral QHS    levothyroxine (SYNTHROID) tablet 100 mcg  100 mcg Oral 6am    enoxaparin (LOVENOX) injection 70 mg  1 mg/kg SubCUTAneous Q24H        Lab Data Reviewed: (see below)  Lab Review:     Recent Labs     12/11/22  0401 12/10/22  1150   WBC 3.6 7.5   HGB 8.9* 9.5*   HCT 27.5* 30.0*   * 161     Recent Labs     12/11/22  0401 12/10/22  1150    137   K 3.9 4.1    106   CO2 25 24   * 79   BUN 36* 33*   CREA 1.09* 1.16*   CA 7.7* 7.7*   MG 2.5*  --    ALB  --  3.0*   TBILI  --  0.5   ALT  --  18     No results found for: GLUCPOC  No results for input(s): PH, PCO2, PO2, HCO3, FIO2 in the last 72 hours. No results for input(s): INR, INREXT in the last 72 hours. All Micro Results       Procedure Component Value Units Date/Time    RESPIRATORY VIRUS PANEL W/COVID-19, PCR [[id number]] Collected: 12/10/22 1355    Order Status: Completed Specimen: Nasopharyngeal Updated: 12/10/22 1816     Adenovirus Not detected        Coronavirus 229E Not detected        Coronavirus HKU1 Not detected        Coronavirus CVNL63 Not detected        Coronavirus OC43 Not detected        SARS-CoV-2, PCR Not detected        Metapneumovirus Not detected        Rhinovirus and Enterovirus Not detected        Influenza A Not detected        Influenza B Not detected        Parainfluenza 1 Not detected        Parainfluenza 2 Not detected        Parainfluenza 3 Not detected        Parainfluenza virus 4 Not detected        RSV by PCR Not detected        B. parapertussis, PCR Not detected        Bordetella pertussis - PCR Not detected        Chlamydophila pneumoniae DNA, QL, PCR Not detected        Mycoplasma pneumoniae DNA, QL, PCR Not detected       COVID-19 RAPID TEST [[id number]] Collected: 12/10/22 1318    Order Status: Completed Specimen: Nasopharyngeal Updated: 12/10/22 1341     Specimen source Nasopharyngeal        COVID-19 rapid test Not detected        Comment: Rapid Abbott ID Now       Rapid NAAT:  The specimen is NEGATIVE for SARS-CoV-2, the novel coronavirus associated with COVID-19. Negative results should be treated as presumptive and, if inconsistent with clinical signs and symptoms or necessary for patient management, should be tested with an alternative molecular assay. Negative results do not preclude SARS-CoV-2 infection and should not be used as the sole basis for patient management decisions. This test has been authorized by the FDA under an Emergency Use Authorization (EUA) for use by authorized laboratories.    Fact sheet for Healthcare Providers:  http://www.yesenia.bimarin/  Fact sheet for Patients: http://www.yesenia.shailesh/       Methodology: Isothermal Nucleic Acid Amplification         URINE CULTURE HOLD SAMPLE [[id number]] Collected: 12/10/22 1255    Order Status: Completed Specimen: Urine Updated: 12/10/22 1258     Urine culture hold       Urine on hold in Microbiology dept for 2 days. If unpreserved urine is submitted, it cannot be used for addtional testing after 24 hours, recollection will be required. Other pertinent lab: none    Total time spent with patient: 30 Minutes I personally reviewed chart, notes, data and current medications in the medical record. I have personally examined and treated the patient at bedside during this period. To assist coordination of care and communication with nursing and staff, this note may be preliminary early in the day, but finalized by end of the day.                  Care Plan discussed with: Patient, Care Manager, Nursing Staff, and >50% of time spent in counseling and coordination of care    Discussed:  Care Plan and D/C Planning    Prophylaxis:  Lovenox and H2B/PPI    Disposition:  Home w/Family           ___________________________________________________    Attending Physician: Kendal Dotson MD

## 2022-12-12 ENCOUNTER — APPOINTMENT (OUTPATIENT)
Dept: NUCLEAR MEDICINE | Age: 87
DRG: 683 | End: 2022-12-12
Attending: INTERNAL MEDICINE
Payer: MEDICARE

## 2022-12-12 ENCOUNTER — APPOINTMENT (OUTPATIENT)
Dept: GENERAL RADIOLOGY | Age: 87
DRG: 683 | End: 2022-12-12
Attending: INTERNAL MEDICINE
Payer: MEDICARE

## 2022-12-12 LAB
ANION GAP SERPL CALC-SCNC: 8 MMOL/L (ref 5–15)
BASOPHILS # BLD: 0 K/UL (ref 0–0.1)
BASOPHILS NFR BLD: 0 % (ref 0–1)
BUN SERPL-MCNC: 44 MG/DL (ref 6–20)
BUN/CREAT SERPL: 35 (ref 12–20)
CALCIUM SERPL-MCNC: 7.8 MG/DL (ref 8.5–10.1)
CHLORIDE SERPL-SCNC: 102 MMOL/L (ref 97–108)
CO2 SERPL-SCNC: 26 MMOL/L (ref 21–32)
CREAT SERPL-MCNC: 1.24 MG/DL (ref 0.55–1.02)
DIFFERENTIAL METHOD BLD: ABNORMAL
EOSINOPHIL # BLD: 0 K/UL (ref 0–0.4)
EOSINOPHIL NFR BLD: 1 % (ref 0–7)
ERYTHROCYTE [DISTWIDTH] IN BLOOD BY AUTOMATED COUNT: 13.8 % (ref 11.5–14.5)
GLUCOSE SERPL-MCNC: 105 MG/DL (ref 65–100)
HCT VFR BLD AUTO: 27.1 % (ref 35–47)
HEMOCCULT STL QL: POSITIVE
HGB BLD-MCNC: 8.8 G/DL (ref 11.5–16)
IMM GRANULOCYTES # BLD AUTO: 0.1 K/UL (ref 0–0.04)
IMM GRANULOCYTES NFR BLD AUTO: 1 % (ref 0–0.5)
LYMPHOCYTES # BLD: 2 K/UL (ref 0.8–3.5)
LYMPHOCYTES NFR BLD: 23 % (ref 12–49)
MAGNESIUM SERPL-MCNC: 2.1 MG/DL (ref 1.6–2.4)
MCH RBC QN AUTO: 31.8 PG (ref 26–34)
MCHC RBC AUTO-ENTMCNC: 32.5 G/DL (ref 30–36.5)
MCV RBC AUTO: 97.8 FL (ref 80–99)
MONOCYTES # BLD: 0.8 K/UL (ref 0–1)
MONOCYTES NFR BLD: 10 % (ref 5–13)
NEUTS SEG # BLD: 5.5 K/UL (ref 1.8–8)
NEUTS SEG NFR BLD: 65 % (ref 32–75)
NRBC # BLD: 0 K/UL (ref 0–0.01)
NRBC BLD-RTO: 0 PER 100 WBC
PLATELET # BLD AUTO: 148 K/UL (ref 150–400)
PMV BLD AUTO: 12.4 FL (ref 8.9–12.9)
POTASSIUM SERPL-SCNC: 3.8 MMOL/L (ref 3.5–5.1)
RBC # BLD AUTO: 2.77 M/UL (ref 3.8–5.2)
SODIUM SERPL-SCNC: 136 MMOL/L (ref 136–145)
WBC # BLD AUTO: 8.4 K/UL (ref 3.6–11)

## 2022-12-12 PROCEDURE — 74011000250 HC RX REV CODE- 250: Performed by: INTERNAL MEDICINE

## 2022-12-12 PROCEDURE — 2709999900 HC NON-CHARGEABLE SUPPLY

## 2022-12-12 PROCEDURE — 80048 BASIC METABOLIC PNL TOTAL CA: CPT

## 2022-12-12 PROCEDURE — 94640 AIRWAY INHALATION TREATMENT: CPT

## 2022-12-12 PROCEDURE — A9540 TC99M MAA: HCPCS

## 2022-12-12 PROCEDURE — 97116 GAIT TRAINING THERAPY: CPT

## 2022-12-12 PROCEDURE — 36415 COLL VENOUS BLD VENIPUNCTURE: CPT

## 2022-12-12 PROCEDURE — 65270000029 HC RM PRIVATE

## 2022-12-12 PROCEDURE — 97162 PT EVAL MOD COMPLEX 30 MIN: CPT

## 2022-12-12 PROCEDURE — 85025 COMPLETE CBC W/AUTO DIFF WBC: CPT

## 2022-12-12 PROCEDURE — 51798 US URINE CAPACITY MEASURE: CPT

## 2022-12-12 PROCEDURE — 74011250637 HC RX REV CODE- 250/637: Performed by: INTERNAL MEDICINE

## 2022-12-12 PROCEDURE — 82272 OCCULT BLD FECES 1-3 TESTS: CPT

## 2022-12-12 PROCEDURE — 83735 ASSAY OF MAGNESIUM: CPT

## 2022-12-12 PROCEDURE — 97165 OT EVAL LOW COMPLEX 30 MIN: CPT

## 2022-12-12 PROCEDURE — 77030038269 HC DRN EXT URIN PURWCK BARD -A

## 2022-12-12 PROCEDURE — 97535 SELF CARE MNGMENT TRAINING: CPT

## 2022-12-12 PROCEDURE — 74018 RADEX ABDOMEN 1 VIEW: CPT

## 2022-12-12 RX ADMIN — IPRATROPIUM BROMIDE AND ALBUTEROL SULFATE 3 ML: .5; 3 SOLUTION RESPIRATORY (INHALATION) at 05:24

## 2022-12-12 RX ADMIN — APIXABAN 2.5 MG: 2.5 TABLET, FILM COATED ORAL at 21:09

## 2022-12-12 RX ADMIN — APIXABAN 2.5 MG: 2.5 TABLET, FILM COATED ORAL at 08:36

## 2022-12-12 RX ADMIN — LEVOTHYROXINE SODIUM 100 MCG: 0.1 TABLET ORAL at 05:10

## 2022-12-12 RX ADMIN — ACETAMINOPHEN 650 MG: 325 TABLET, FILM COATED ORAL at 04:33

## 2022-12-12 RX ADMIN — ACETAMINOPHEN 650 MG: 325 TABLET, FILM COATED ORAL at 21:09

## 2022-12-12 RX ADMIN — Medication 10 ML: at 05:11

## 2022-12-12 RX ADMIN — CITALOPRAM HYDROBROMIDE 20 MG: 20 TABLET ORAL at 08:36

## 2022-12-12 RX ADMIN — Medication 10 ML: at 21:09

## 2022-12-12 RX ADMIN — DONEPEZIL HYDROCHLORIDE 10 MG: 5 TABLET, FILM COATED ORAL at 21:09

## 2022-12-12 NOTE — PROGRESS NOTES
3:16 pm:  CM met with patient's son at bedside to provide update on SNF referrals. Referral to [facility] is still pending. [facility] has accepted and will begin insurance authorization.       Nilsa Marie LCSW

## 2022-12-12 NOTE — PROGRESS NOTES
Bedside shift change report given to [address] [facility] (oncoming nurse) by Cassie Cruz (offgoing nurse). Report included the following information SBAR, Kardex, Procedure Summary, Intake/Output, MAR, Recent Results, and Cardiac Rhythm Sinus manpreet .

## 2022-12-12 NOTE — PROGRESS NOTES
Problem: Falls - Risk of  Goal: *Absence of Falls  Description: Document Lashay Bishop Fall Risk and appropriate interventions in the flowsheet. Outcome: Progressing Towards Goal  Note: Fall Risk Interventions:  Mobility Interventions: Bed/chair exit alarm    Mentation Interventions: Adequate sleep, hydration, pain control, Bed/chair exit alarm, Door open when patient unattended, Increase mobility, More frequent rounding, Reorient patient, Room close to nurse's station, Toileting rounds, Update white board    Medication Interventions: Patient to call before getting OOB, Teach patient to arise slowly    Elimination Interventions: Patient to call for help with toileting needs, Bed/chair exit alarm, Call light in reach, Toileting schedule/hourly rounds              Problem: Patient Education: Go to Patient Education Activity  Goal: Patient/Family Education  Outcome: Progressing Towards Goal     Problem: Pressure Injury - Risk of  Goal: *Prevention of pressure injury  Description: Document Roberto Scale and appropriate interventions in the flowsheet.   Outcome: Progressing Towards Goal  Note: Pressure Injury Interventions:  Sensory Interventions: Assess changes in LOC, Float heels, Keep linens dry and wrinkle-free, Maintain/enhance activity level    Moisture Interventions: Internal/External urinary devices    Activity Interventions: Increase time out of bed, PT/OT evaluation    Mobility Interventions: PT/OT evaluation    Nutrition Interventions: Document food/fluid/supplement intake                     Problem: Patient Education: Go to Patient Education Activity  Goal: Patient/Family Education  Outcome: Progressing Towards Goal

## 2022-12-12 NOTE — PROGRESS NOTES
Problem: Falls - Risk of  Goal: *Absence of Falls  Description: Document Joyce Rivera Fall Risk and appropriate interventions in the flowsheet. Outcome: Progressing Towards Goal  Note: Fall Risk Interventions:  Mobility Interventions: Bed/chair exit alarm    Mentation Interventions: Adequate sleep, hydration, pain control, Bed/chair exit alarm    Medication Interventions: Bed/chair exit alarm    Elimination Interventions: Bed/chair exit alarm              Problem: Patient Education: Go to Patient Education Activity  Goal: Patient/Family Education  Outcome: Progressing Towards Goal     Problem: Pressure Injury - Risk of  Goal: *Prevention of pressure injury  Description: Document Roberto Scale and appropriate interventions in the flowsheet. Outcome: Progressing Towards Goal  Note: Pressure Injury Interventions:  Sensory Interventions: Assess changes in LOC, Float heels, Keep linens dry and wrinkle-free, Maintain/enhance activity level    Moisture Interventions: Limit adult briefs, Maintain skin hydration (lotion/cream), Absorbent underpads    Activity Interventions: Increase time out of bed, PT/OT evaluation    Mobility Interventions: HOB 30 degrees or less, PT/OT evaluation    Nutrition Interventions: Document food/fluid/supplement intake                     Problem: Pressure Injury - Risk of  Goal: *Prevention of pressure injury  Description: Document Roberto Scale and appropriate interventions in the flowsheet.   Outcome: Progressing Towards Goal  Note: Pressure Injury Interventions:  Sensory Interventions: Assess changes in LOC, Float heels, Keep linens dry and wrinkle-free, Maintain/enhance activity level    Moisture Interventions: Limit adult briefs, Maintain skin hydration (lotion/cream), Absorbent underpads    Activity Interventions: Increase time out of bed, PT/OT evaluation    Mobility Interventions: HOB 30 degrees or less, PT/OT evaluation    Nutrition Interventions: Document food/fluid/supplement intake Problem: Patient Education: Go to Patient Education Activity  Goal: Patient/Family Education  Outcome: Progressing Towards Goal

## 2022-12-12 NOTE — PROGRESS NOTES
[facility]  [address], [facility] [address]  [phone]    Hospitalist Progress Note      NAME: Victoriano Meredith   :  1930  MRM:  [id number]    Date/Time of service 2022  12:26 AM          Assessment and Plan:     Dyspnea - POA, unclear etiology, better. DDX URI, anemia, debility, anxiety, PE. No SIRS. No hypoxia. Clear lungs on xray. Negative RVP, Flu and COVID. Check oxygen requirement with PT      DEBORAH (acute kidney injury) / Dehydration - POA presume due to poor PO intake. Hydrated, but it did not improve. May have CKD3. Acute deep venous thrombosis / Elevated d-dimer - POA, new, no hemodynamic compromise or hypoxia. US finds one small DVT in L peroneal vein. VQ scan to check lungs. If low prob, I would not recommend aggressive treatment. Otherwise low dose eliquis. Debilitated patient - Worse since admission weeks ago. Needs HH PT OT vs SNF. CM consulted. Anemia / Thrombocytopenia - Low iron on serologies. Give IV iron now and PO iron at home    HTN (hypertension) - BP low normal. Stopped lisinopril    Hypothyroid - TSH normal. Continue synthroid    Dementia / Anxiety and depression / IBS (irritable bowel syndrome) - POA, stable. Continue donepazil and citalopram       Subjective:     Chief Complaint:  weak    ROS:  (bold if positive, if negative)    SOB/DOETolerating some PT  Tolerating Diet        Objective:     Last 24hrs VS reviewed since prior progress note.  Most recent are:    Visit Vitals  BP (!) 145/71 (BP 1 Location: Left upper arm, BP Patient Position: Semi fowlers)   Pulse 63   Temp 97.3 °F (36.3 °C)   Resp 18   Ht 5' 5\" (1.651 m)   Wt 65.8 kg (145 lb)   SpO2 98%   BMI 24.13 kg/m²     SpO2 Readings from Last 6 Encounters:   22 98%   12 100%   12 98%          Intake/Output Summary (Last 24 hours) at 2022 0801  Last data filed at 2022 0448  Gross per 24 hour   Intake 430 ml   Output 2300 ml   Net -1870 ml Physical Exam:    Gen:  Well-developed, well-nourished, in no acute distress  HEENT:  Pink conjunctivae, PERRL, hearing intact to voice, moist mucous membranes  Neck:  Supple, without masses, thyroid non-tender  Resp:  No accessory muscle use, clear breath sounds without wheezes rales or rhonchi  Card:  No murmurs, normal S1, S2 without thrills, bruits or peripheral edema  Abd:  Soft, non-tender, non-distended, normoactive bowel sounds are present, no mass  Lymph:  No cervical or inguinal adenopathy  Musc:  No cyanosis or clubbing  Skin:  No rashes or ulcers, skin turgor is good  Neuro:  Cranial nerves are grossly intact, general motor weakness, follows commands   Psych:  Poor insight, oriented to person, place and time, anxiety    Telemetry reviewed:   normal sinus rhythm  __________________________________________________________________  Medications Reviewed: (see below)  Medications:     Current Facility-Administered Medications   Medication Dose Route Frequency    albuterol-ipratropium (DUO-NEB) 2.5 MG-0.5 MG/3 ML  3 mL Nebulization Q6H PRN    apixaban (ELIQUIS) tablet 2.5 mg  2.5 mg Oral BID    sodium chloride (NS) flush 5-40 mL  5-40 mL IntraVENous Q8H    sodium chloride (NS) flush 5-40 mL  5-40 mL IntraVENous PRN    acetaminophen (TYLENOL) tablet 650 mg  650 mg Oral Q6H PRN    polyethylene glycol (MIRALAX) packet 17 g  17 g Oral DAILY PRN    ondansetron (ZOFRAN ODT) tablet 4 mg  4 mg Oral Q8H PRN    citalopram (CELEXA) tablet 20 mg  20 mg Oral DAILY    donepeziL (ARICEPT) tablet 10 mg  10 mg Oral QHS    levothyroxine (SYNTHROID) tablet 100 mcg  100 mcg Oral 6am        Lab Data Reviewed: (see below)  Lab Review:     Recent Labs     12/12/22  0025 12/11/22  0401 12/10/22  1150   WBC 8.4 3.6 7.5   HGB 8.8* 8.9* 9.5*   HCT 27.1* 27.5* 30.0*   * 130* 161       Recent Labs     12/12/22  0025 12/11/22  0401 12/10/22  1150    137 137   K 3.8 3.9 4.1    103 106   CO2 26 25 24   * 258* 79   BUN 44* 36* 33*   CREA 1.24* 1.09* 1.16*   CA 7.8* 7.7* 7.7*   MG 2.1 2.5*  --    ALB  --   --  3.0*   TBILI  --   --  0.5   ALT  --   --  18       No results found for: GLUCPOC  No results for input(s): PH, PCO2, PO2, HCO3, FIO2 in the last 72 hours. No results for input(s): INR, INREXT, INREXT in the last 72 hours. All Micro Results       Procedure Component Value Units Date/Time    RESPIRATORY VIRUS PANEL W/COVID-19, PCR [[id number]] Collected: 12/10/22 1355    Order Status: Completed Specimen: Nasopharyngeal Updated: 12/10/22 1816     Adenovirus Not detected        Coronavirus 229E Not detected        Coronavirus HKU1 Not detected        Coronavirus CVNL63 Not detected        Coronavirus OC43 Not detected        SARS-CoV-2, PCR Not detected        Metapneumovirus Not detected        Rhinovirus and Enterovirus Not detected        Influenza A Not detected        Influenza B Not detected        Parainfluenza 1 Not detected        Parainfluenza 2 Not detected        Parainfluenza 3 Not detected        Parainfluenza virus 4 Not detected        RSV by PCR Not detected        B. parapertussis, PCR Not detected        Bordetella pertussis - PCR Not detected        Chlamydophila pneumoniae DNA, QL, PCR Not detected        Mycoplasma pneumoniae DNA, QL, PCR Not detected       COVID-19 RAPID TEST [[id number]] Collected: 12/10/22 1318    Order Status: Completed Specimen: Nasopharyngeal Updated: 12/10/22 1341     Specimen source Nasopharyngeal        COVID-19 rapid test Not detected        Comment: Rapid Abbott ID Now       Rapid NAAT:  The specimen is NEGATIVE for SARS-CoV-2, the novel coronavirus associated with COVID-19. Negative results should be treated as presumptive and, if inconsistent with clinical signs and symptoms or necessary for patient management, should be tested with an alternative molecular assay.   Negative results do not preclude SARS-CoV-2 infection and should not be used as the sole basis for patient management decisions. This test has been authorized by the FDA under an Emergency Use Authorization (EUA) for use by authorized laboratories. Fact sheet for Healthcare Providers:  http://www.yesenia.shailesh/  Fact sheet for Patients: http://www.yesenia.shailesh/       Methodology: Isothermal Nucleic Acid Amplification         URINE CULTURE HOLD SAMPLE [[id number]] Collected: 12/10/22 1255    Order Status: Completed Specimen: Urine Updated: 12/10/22 1258     Urine culture hold       Urine on hold in Microbiology dept for 2 days. If unpreserved urine is submitted, it cannot be used for addtional testing after 24 hours, recollection will be required. Other pertinent lab: none    Total time spent with patient: 30 Minutes I personally reviewed chart, notes, data and current medications in the medical record. I have personally examined and treated the patient at bedside during this period. To assist coordination of care and communication with nursing and staff, this note may be preliminary early in the day, but finalized by end of the day.                  Care Plan discussed with: Patient, Care Manager, Nursing Staff, and >50% of time spent in counseling and coordination of care    Discussed:  Care Plan and D/C Planning    Prophylaxis:  Lovenox and H2B/PPI    Disposition:  Home w/Family           ___________________________________________________    Attending Physician: Lashay Durand MD

## 2022-12-12 NOTE — PROGRESS NOTES
Problem: Mobility Impaired (Adult and Pediatric)  Goal: *Acute Goals and Plan of Care (Insert Text)  Description: FUNCTIONAL STATUS PRIOR TO ADMISSION: Patient is a poor historian, unsure of functional mobility independence level or DME but per chart, family was having to assist patient with mobility and stair climbing recently. HOME SUPPORT PRIOR TO ADMISSION: The patient most recently lived with her son, unsure of level of assistance needed. Physical Therapy Goals  Initiated 12/12/2022  1. Patient will move from supine to sit and sit to supine , scoot up and down, and roll side to side in bed with independence within 7 day(s). 2.  Patient will transfer from bed to chair and chair to bed with supervision/set-up using the least restrictive device within 7 day(s). 3.  Patient will perform sit to stand with supervision/set-up within 7 day(s). 4.  Patient will ambulate with supervision/set-up for 150 feet with the least restrictive device within 7 day(s). 5.  Patient will ascend/descend 12 stairs with 1 handrail(s) with modified independence within 7 day(s). Outcome: Not Met   PHYSICAL THERAPY EVALUATION  Patient: Mike Trujillo (80 y.o. female)  Date: 12/12/2022  Primary Diagnosis: Dyspnea [R06.00]       Precautions:          ASSESSMENT  Based on the objective data described below, the patient presents with generalized weakness, poor short term memory and confusion, impaired dynamic standing balance, decreased activity tolerance, and gait dysfunction likely not fully consistent with baseline functional mobility level s/p admission for dyspnea. Patient oriented only to self and being in a hospital but confused throughout session and repeating the same questions over and over. Very pleasant and cooperative, however. Patient overall needing only CGA for mobility, up to MIN for gait due to never using RW prior to now per patient.   Unsure of baseline level of mobility but reports not using any AD, family indicates difficulty with assisting her upstairs to 2nd floor bedroom. Recommend SNF rehab to maximize strength and endurance to allow improved disposition options with less caregiver assistance. Current Level of Function Impacting Discharge (mobility/balance): CGA to MIN A gait x 15' and transfers. Functional Outcome Measure: The patient scored Total: 55/100 on the Barthel Index outcome measure which is indicative of being partially dependent in basic self-care. Other factors to consider for discharge: family ability to support patient     Patient will benefit from skilled therapy intervention to address the above noted impairments. PLAN :  Recommendations and Planned Interventions: bed mobility training, transfer training, gait training, therapeutic exercises, neuromuscular re-education, patient and family training/education, and therapeutic activities      Frequency/Duration: Patient will be followed by physical therapy:  5 times a week to address goals. Recommendation for discharge: (in order for the patient to meet his/her long term goals)  Therapy up to 5 days/week in SNF setting    This discharge recommendation:  Has been made in collaboration with the attending provider and/or case management    IF patient discharges home will need the following DME: to be determined (TBD), likely RW         SUBJECTIVE:   Patient stated Do my sons know I'm here (asked at least 6 times).     OBJECTIVE DATA SUMMARY:   HISTORY:    Past Medical History:   Diagnosis Date    Anxiety and depression     Diverticular disease of colon     High cholesterol     HTN (hypertension)     Hypothyroid     IBS (irritable bowel syndrome)      Past Surgical History:   Procedure Laterality Date    ENDOSCOPY, COLON, DIAGNOSTIC  3/10    Tics        o/w  neg    HX CATARACT REMOVAL      HX CHOLECYSTECTOMY      HX GYN  1985    hysterectomy    HX KNEE REPLACEMENT  8/2012    Right    [place] ER[place]/SPHINCTEROTOMY/PAPILLOTOMY 6/01       Personal factors and/or comorbidities impacting plan of care:     Home Situation  Home Environment: Private residence  # Steps to Enter: 2  One/Two Story Residence: Two story  Living Alone: No  Support Systems: Child(edilia)  Patient Expects to be Discharged to[de-identified] Skilled nursing facility  Current DME Used/Available at Home:  (unsure)  Tub or Shower Type: Tub/Shower combination    EXAMINATION/PRESENTATION/DECISION MAKING:   Critical Behavior:  Neurologic State: Alert, Confused  Orientation Level: Oriented to person, Disoriented to time, Disoriented to situation (oriented to being in a hospital unsure of name, believes it is July)  Cognition: Follows commands, Poor safety awareness     Hearing: Auditory  Auditory Impairment: None    Range Of Motion:  AROM: Generally decreased, functional                       Strength:    Strength: Generally decreased, functional                    Tone & Sensation:   Tone: Normal              Sensation: Intact               Coordination:  Coordination: Within functional limits  Vision:      Functional Mobility:  Bed Mobility:  Rolling: Stand-by assistance  Supine to Sit: Stand-by assistance     Scooting: Stand-by assistance  Transfers:  Sit to Stand: Contact guard assistance; Additional time  Stand to Sit: Contact guard assistance; Additional time        Bed to Chair: Contact guard assistance; Additional time              Balance:   Sitting: Intact  Standing: Impaired; With support  Standing - Static: Constant support; Fair  Standing - Dynamic : Constant support; Fair  Ambulation/Gait Training:  Distance (ft): 15 Feet (ft) (x 2)  Assistive Device: Gait belt;Walker, rolling  Ambulation - Level of Assistance: Contact guard assistance;Minimal assistance (mild RW management)        Gait Abnormalities: Decreased step clearance;Shuffling gait        Base of Support: Widened     Speed/Lorna: Pace decreased (<100 feet/min)  Step Length: Right shortened;Left shortened Functional Measure:  Barthel Index:    Bathin  Bladder: 5  Bowels: 5  Groomin  Dressin  Feeding: 10  Mobility: 10 (would be able to with rest breaks)  Stairs: 0  Toilet Use: 5  Transfer (Bed to Chair and Back): 10  Total: 55/100       The Barthel ADL Index: Guidelines  1. The index should be used as a record of what a patient does, not as a record of what a patient could do. 2. The main aim is to establish degree of independence from any help, physical or verbal, however minor and for whatever reason. 3. The need for supervision renders the patient not independent. 4. A patient's performance should be established using the best available evidence. Asking the patient, friends/relatives and nurses are the usual sources, but direct observation and common sense are also important. However direct testing is not needed. 5. Usually the patient's performance over the preceding 24-48 hours is important, but occasionally longer periods will be relevant. 6. Middle categories imply that the patient supplies over 50 per cent of the effort. 7. Use of aids to be independent is allowed. Score Interpretation (from [address] [address])    Independent   60-79 Minimally independent   40-59 Partially dependent   20-39 Very dependent   <20 Totally dependent     -Chemo Huggins., Barthel, D.W. (1965). Functional evaluation: the Barthel Index. [address] [facility] ([address] (1997). The Barthel activities of daily living index: self-reporting versus actual performance in the old (> or = 75 years). Journal of [address] [place] [address] 45(7), 14 [facility], J.J.M.F, Froilan Looney., Starla Rendon. (1999). Measuring the change in disability after inpatient rehabilitation; comparison of the responsiveness of the Barthel Index and Functional [place] Measure. Journal of Neurology, Neurosurgery, and Psychiatry, 66(4), 107-286.   CAROL ANN Taylor.EMILIANO, MYNOR Lopez, & Mateo White M.A. (2004) Assessment of post-stroke quality of life in cost-effectiveness studies: The usefulness of the Barthel Index and the EuroQoL-5D. Quality of Life Research, 15, 358-37        Physical Therapy Evaluation Charge Determination   History Examination Presentation Decision-Making   HIGH Complexity :3+ comorbidities / personal factors will impact the outcome/ POC  MEDIUM Complexity : 3 Standardized tests and measures addressing body structure, function, activity limitation and / or participation in recreation  MEDIUM Complexity : Evolving with changing characteristics  Other outcome measures Barthel 55  MEDIUM      Based on the above components, the patient evaluation is determined to be of the following complexity level: MEDIUM    Pain Rating:  None reported    Activity Tolerance:   Fair    After treatment patient left in no apparent distress:   Sitting in chair, Call bell within reach, and Bed / chair alarm activated    COMMUNICATION/EDUCATION:   The patients plan of care was discussed with: Occupational therapist and Registered nurse. Fall prevention education was provided and the patient/caregiver indicated understanding. and Patient/family have participated as able in goal setting and plan of care.     Thank you for this referral.  Suly Sykes, PT   Time Calculation: 30 mins

## 2022-12-12 NOTE — PROGRESS NOTES
Patient complaining of lower abdominal pain. Had a bowel movement and she still states that Her abdomen feels \"funny, like she is passing a stone\". Denies any nausea. Tylenol administered will reassess in one hour.

## 2022-12-12 NOTE — PROGRESS NOTES
Problem: Self Care Deficits Care Plan (Adult)  Goal: *Acute Goals and Plan of Care (Insert Text)  Description: FUNCTIONAL STATUS PRIOR TO ADMISSION: Per chart review and patient report, patient lives alone at baseline (recently moved in with DIL and son) and was independent for self care and functional transfers/mobility at baseline requiring increased assist recently     HOME SUPPORT: Patient lives alone at baseline however recently moved in with son and DIL and they provide assist as needed. Occupational Therapy Goals  Initiated 12/12/2022  1. Patient will perform grooming with supervision/set-up within 7 day(s). 2.  Patient will perform upper body bathing/dressing with supervision/set-up within 7 day(s). 3.  Patient will perform lower body bathing/dressing with supervision/set-up within 7 day(s). 4.  Patient will perform toilet transfers with supervision/set-up within 7 day(s). 5.  Patient will perform all aspects of toileting with supervision/set-up within 7 day(s). 6.  Patient will participate in upper extremity therapeutic exercise/activities with supervision/set-up for 10 minutes within 7 day(s). 7.  Patient will utilize energy conservation techniques during functional activities with verbal cues within 7 day(s). Outcome: Not Met     OCCUPATIONAL THERAPY EVALUATION  Patient: Tamara Arana (80 y.o. female)  Date: 12/12/2022  Primary Diagnosis: Dyspnea [R06.00]       Precautions: Falls       ASSESSMENT  Patient received semi supine in bed A&O to self, type of place (patient unsure of name thus patient reoriented to place and time) and agreeable for OT/PT eval/tx. Pt unsure of home information however stating she is independent for self care and functional transfers/mobility. Per chart review, patient recently staying with son and DIL and patient having difficulty going up stairs.      Pt presents with decreased balance, decreased activity tolerance, decreased safety awareness, generalized weakness and increased need for assist with self care (SBA grooming seated on commode, CGA washing hands at sink, mod A toileting during stance for thoroughness after BM) and functional transfers/mobility (SBA sup->sit and scooting EOB, CGA sit<->stand and toilet transfer with RW and amaury HHA and on way back to recliner use of RW with cueing for safe walker management in bathroom). Patient noted with decreased short term memory asking every few minutes \"what is wrong with me; where am I?; do my son's know where I am?.\" Patient would benefit from continued skilled OT services [facility] at [facility] in order to increase safety and independence with self care and functional transfers/mobility. Recommend discharge to SNF as patient below baseline functional level. Functional Outcome Measure: The patient scored Total: 55/100 on the Barthel Index outcome measure     Other factors to consider for discharge: time since onset, severity of deficits, PLOF        PLAN :  Recommendations and Planned Interventions: self care training, functional mobility training, therapeutic exercise, balance training, therapeutic activities, endurance activities, patient education, and home safety training    Frequency/Duration: Patient will be followed by occupational therapy 5 times a week to address goals.     Recommendation for discharge: (in order for the patient to meet his/her long term goals)  SNF    This discharge recommendation:  Has been made in collaboration with the attending provider and/or case management    IF patient discharges home will need the following DME: TBD       SUBJECTIVE:   Patient stated Do my sons know where I am.    OBJECTIVE DATA SUMMARY:   HISTORY:   Past Medical History:   Diagnosis Date    Anxiety and depression     Diverticular disease of colon     High cholesterol     HTN (hypertension)     Hypothyroid     IBS (irritable bowel syndrome)      Past Surgical History:   Procedure Laterality Date    ENDOSCOPY, COLON, DIAGNOSTIC  3/10    Tics        o/w  neg    HX CATARACT REMOVAL      HX CHOLECYSTECTOMY      HX GYN  1985    hysterectomy    HX KNEE REPLACEMENT  8/2012    Right    [place] ERCP W/SPHINCTEROTOMY/PAPILLOTOMY  6/01       Expanded or extensive additional review of patient history:     Home Situation  Home Environment: Private residence  # Steps to Enter: 2  One/Two Story Residence: Two story  Living Alone: No  Support Systems: Child(edilia)  Patient Expects to be Discharged to[de-identified] Skilled nursing facility  Current DME Used/Available at Home: [employer] Fast or Shower Type: Tub/Shower combination    EXAMINATION OF PERFORMANCE DEFICITS:  Cognitive/Behavioral Status:  Neurologic State: Alert;Confused  Orientation Level: Oriented to person;Disoriented to time;Disoriented to situation (oriented to being in a hospital unsure of name, believes it is July)  Cognition: Follows commands;Poor safety awareness     Hearing: Auditory  Auditory Impairment: None    Range of Motion:  AROM: Generally decreased, functional     Strength:  Strength: Generally decreased, functional     Coordination:  Coordination: (P) Within functional limits       Tone & Sensation:  Tone: (P) Normal  Sensation: (P) Intact     Balance:  Sitting: Intact  Standing: Impaired; With support  Standing - Static: Constant support; Fair  Standing - Dynamic : Constant support; Fair    Functional Mobility and Transfers for ADLs:  Bed Mobility:  Rolling: Stand-by assistance  Supine to Sit: Stand-by assistance  Scooting: Stand-by assistance    Transfers:  Sit to Stand: Contact guard assistance; Additional time  Stand to Sit: Contact guard assistance; Additional time  Bed to Chair: Contact guard assistance; Additional time  Bathroom Mobility: Contact guard assistance;Minimum assistance (cueing for safe walker management)  Toilet Transfer : Contact guard assistance; Additional time  Assistive Device : Gait Belt;Walker, rolling    ADL Assessment:     Oral Facial Hygiene/Grooming: Contact guard assistance (standing sink)    Toileting: Moderate assistance (during stance due to amount of BM and for thoroughness)     ADL Intervention and task modifications:     Grooming  Grooming Assistance: Contact guard assistance  Position Performed: Standing  Washing Face: Stand-by assistance  Washing Hands: Contact guard assistance  Brushing/Combing Hair: Stand-by assistance    Toileting  Toileting Assistance: Moderate assistance  Bowel Hygiene: Moderate assistance  Clothing Management: Contact guard assistance     Functional Measure:    Barthel Index:  Bathin  Bladder: 5  Bowels: 5  Groomin  Dressin  Feeding: 10  Mobility: 10 (would be able to with rest breaks)  Stairs: 0  Toilet Use: 5  Transfer (Bed to Chair and Back): 10  Total: 55/100      The Barthel ADL Index: Guidelines  1. The index should be used as a record of what a patient does, not as a record of what a patient could do. 2. The main aim is to establish degree of independence from any help, physical or verbal, however minor and for whatever reason. 3. The need for supervision renders the patient not independent. 4. A patient's performance should be established using the best available evidence. Asking the patient, friends/relatives and nurses are the usual sources, but direct observation and common sense are also important. However direct testing is not needed. 5. Usually the patient's performance over the preceding 24-48 hours is important, but occasionally longer periods will be relevant. 6. Middle categories imply that the patient supplies over 50 per cent of the effort. 7. Use of aids to be independent is allowed. Score Interpretation (from [address] [facility] [address])    Independent   60-79 Minimally independent   40-59 Partially dependent   20-39 Very dependent   <20 Totally dependent     -Chemo Huggins., Barthel, D.W. (1965). Functional evaluation: the Barthel Index. [address] [facility] ([address] ().  The Barthel activities of daily living index: self-reporting versus actual performance in the old (> or = 75 years). Journal of [address] [place] [address] 457), 14 [facility], DON, Crescencio Suarez., Ariel Anuj. (1999). Measuring the change in disability after inpatient rehabilitation; comparison of the responsiveness of the Barthel Index and Functional [place] Measure. Journal of Neurology, Neurosurgery, and Psychiatry, 66(4), 725-761. ROZ Rome, MYNOR Lopez, & Jeremi Bernal M.A. (2004) Assessment of post-stroke quality of life in cost-effectiveness studies: The usefulness of the Barthel Index and the EuroQoL-5D. Quality of Life Research, 15, 271-67     Occupational Therapy Evaluation Charge Determination   History Examination Decision-Making   LOW Complexity : Brief history review  MEDIUM Complexity : 3-5 performance deficits relating to physical, cognitive , or psychosocial skils that result in activity limitations and / or participation restrictions MEDIUM Complexity : Patient may present with comorbidities that affect occupational performnce. Miniml to moderate modification of tasks or assistance (eg, physical or verbal ) with assesment(s) is necessary to enable patient to complete evaluation       Based on the above components, the patient evaluation is determined to be of the following complexity level: LOW   Pain Rating:  No pain reported    Activity Tolerance:   Fair and requires rest breaks    After treatment patient left in no apparent distress:    Sitting in chair, Call bell within reach, and Bed / chair alarm activated    COMMUNICATION/EDUCATION:   The patients plan of care was discussed with: Physical therapist and Registered nurse. Co-treatment completed with PT for increased patient and clinician safety    Home safety education was provided and the patient/caregiver indicated understanding.     This patients plan of care is appropriate for delegation to [facility].     Thank you for this referral.  Milla Thompson  Time Calculation: 29 mins

## 2022-12-13 LAB
ANION GAP SERPL CALC-SCNC: 6 MMOL/L (ref 5–15)
BASOPHILS # BLD: 0 K/UL (ref 0–0.1)
BASOPHILS NFR BLD: 0 % (ref 0–1)
BUN SERPL-MCNC: 34 MG/DL (ref 6–20)
BUN/CREAT SERPL: 31 (ref 12–20)
CALCIUM SERPL-MCNC: 7.9 MG/DL (ref 8.5–10.1)
CHLORIDE SERPL-SCNC: 107 MMOL/L (ref 97–108)
CO2 SERPL-SCNC: 23 MMOL/L (ref 21–32)
CREAT SERPL-MCNC: 1.09 MG/DL (ref 0.55–1.02)
DIFFERENTIAL METHOD BLD: ABNORMAL
EOSINOPHIL # BLD: 0.1 K/UL (ref 0–0.4)
EOSINOPHIL NFR BLD: 2 % (ref 0–7)
ERYTHROCYTE [DISTWIDTH] IN BLOOD BY AUTOMATED COUNT: 14.2 % (ref 11.5–14.5)
GLUCOSE SERPL-MCNC: 101 MG/DL (ref 65–100)
HCT VFR BLD AUTO: 29.1 % (ref 35–47)
HGB BLD-MCNC: 9.6 G/DL (ref 11.5–16)
IMM GRANULOCYTES # BLD AUTO: 0 K/UL (ref 0–0.04)
IMM GRANULOCYTES NFR BLD AUTO: 1 % (ref 0–0.5)
LYMPHOCYTES # BLD: 2 K/UL (ref 0.8–3.5)
LYMPHOCYTES NFR BLD: 31 % (ref 12–49)
MAGNESIUM SERPL-MCNC: 2 MG/DL (ref 1.6–2.4)
MCH RBC QN AUTO: 32.8 PG (ref 26–34)
MCHC RBC AUTO-ENTMCNC: 33 G/DL (ref 30–36.5)
MCV RBC AUTO: 99.3 FL (ref 80–99)
MONOCYTES # BLD: 0.7 K/UL (ref 0–1)
MONOCYTES NFR BLD: 10 % (ref 5–13)
NEUTS SEG # BLD: 3.7 K/UL (ref 1.8–8)
NEUTS SEG NFR BLD: 57 % (ref 32–75)
NRBC # BLD: 0 K/UL (ref 0–0.01)
NRBC BLD-RTO: 0 PER 100 WBC
PLATELET # BLD AUTO: 138 K/UL (ref 150–400)
PMV BLD AUTO: 12.2 FL (ref 8.9–12.9)
POTASSIUM SERPL-SCNC: 4.1 MMOL/L (ref 3.5–5.1)
RBC # BLD AUTO: 2.93 M/UL (ref 3.8–5.2)
SODIUM SERPL-SCNC: 136 MMOL/L (ref 136–145)
WBC # BLD AUTO: 6.5 K/UL (ref 3.6–11)

## 2022-12-13 PROCEDURE — 94640 AIRWAY INHALATION TREATMENT: CPT

## 2022-12-13 PROCEDURE — 97116 GAIT TRAINING THERAPY: CPT

## 2022-12-13 PROCEDURE — 74011250637 HC RX REV CODE- 250/637: Performed by: INTERNAL MEDICINE

## 2022-12-13 PROCEDURE — 97535 SELF CARE MNGMENT TRAINING: CPT

## 2022-12-13 PROCEDURE — 65270000029 HC RM PRIVATE

## 2022-12-13 PROCEDURE — 94664 DEMO&/EVAL PT USE INHALER: CPT

## 2022-12-13 PROCEDURE — 80048 BASIC METABOLIC PNL TOTAL CA: CPT

## 2022-12-13 PROCEDURE — 83735 ASSAY OF MAGNESIUM: CPT

## 2022-12-13 PROCEDURE — 74011000250 HC RX REV CODE- 250: Performed by: INTERNAL MEDICINE

## 2022-12-13 PROCEDURE — 85025 COMPLETE CBC W/AUTO DIFF WBC: CPT

## 2022-12-13 PROCEDURE — 36415 COLL VENOUS BLD VENIPUNCTURE: CPT

## 2022-12-13 RX ADMIN — CITALOPRAM HYDROBROMIDE 20 MG: 20 TABLET ORAL at 10:12

## 2022-12-13 RX ADMIN — APIXABAN 2.5 MG: 2.5 TABLET, FILM COATED ORAL at 21:14

## 2022-12-13 RX ADMIN — ACETAMINOPHEN 650 MG: 325 TABLET, FILM COATED ORAL at 21:13

## 2022-12-13 RX ADMIN — DONEPEZIL HYDROCHLORIDE 10 MG: 5 TABLET, FILM COATED ORAL at 21:13

## 2022-12-13 RX ADMIN — Medication 10 ML: at 05:34

## 2022-12-13 RX ADMIN — LEVOTHYROXINE SODIUM 100 MCG: 0.1 TABLET ORAL at 05:34

## 2022-12-13 RX ADMIN — Medication 10 ML: at 21:15

## 2022-12-13 RX ADMIN — IPRATROPIUM BROMIDE AND ALBUTEROL SULFATE 3 ML: .5; 3 SOLUTION RESPIRATORY (INHALATION) at 22:04

## 2022-12-13 RX ADMIN — APIXABAN 2.5 MG: 2.5 TABLET, FILM COATED ORAL at 10:12

## 2022-12-13 RX ADMIN — IPRATROPIUM BROMIDE AND ALBUTEROL SULFATE 3 ML: .5; 3 SOLUTION RESPIRATORY (INHALATION) at 03:25

## 2022-12-13 NOTE — DISCHARGE INSTRUCTIONS
Patient Discharge Instructions    Manuel Camilo / [id number] : 1930    Admitted 12/10/2022 Discharged: 2022     Primary Diagnoses  @[email or web address]@    Take Home Medications     It is important that you take the medication exactly as they are prescribed. Keep your medication in the bottles provided by the pharmacist and keep a list of the medication names, dosages, and times to be taken in your wallet. Do not take other medications without consulting your doctor. What to do at Home    Recommended diet: Regular Diet    Recommended activity: Activity as tolerated and PT/OT Eval and Treat    If you experience worse symptoms, please follow up with your PCP. Follow-up with your PCP in a few weeks    [unfilled]     Information obtained by :  I understand that if any problems occur once I am at home I am to contact my physician. I understand and acknowledge receipt of the instructions indicated above.                                                                                                                                            Physician's or R.N.'s Signature                                                                  Date/Time                                                                                                                                              Patient or Representative Signature                                                          Date/Time

## 2022-12-13 NOTE — PROGRESS NOTES
Problem: Mobility Impaired (Adult and Pediatric)  Goal: *Acute Goals and Plan of Care (Insert Text)  Description: FUNCTIONAL STATUS PRIOR TO ADMISSION: Patient is a poor historian, unsure of functional mobility independence level or DME but per chart, family was having to assist patient with mobility and stair climbing recently. HOME SUPPORT PRIOR TO ADMISSION: The patient most recently lived with her son, unsure of level of assistance needed. Physical Therapy Goals  Initiated 12/12/2022  1. Patient will move from supine to sit and sit to supine , scoot up and down, and roll side to side in bed with independence within 7 day(s). 2.  Patient will transfer from bed to chair and chair to bed with supervision/set-up using the least restrictive device within 7 day(s). 3.  Patient will perform sit to stand with supervision/set-up within 7 day(s). 4.  Patient will ambulate with supervision/set-up for 150 feet with the least restrictive device within 7 day(s). 5.  Patient will ascend/descend 12 stairs with 1 handrail(s) with modified independence within 7 day(s). Outcome: Progressing Towards Goal  Note:   PHYSICAL THERAPY TREATMENT  Patient: Leandro Joseph (80 y.o. female)  Date: 12/13/2022  Diagnosis: Dyspnea [R06.00] <principal problem not specified>      Precautions:  fall  Chart, physical therapy assessment, plan of care and goals were reviewed. ASSESSMENT  Patient continues with skilled PT services and is progressing towards goals. Patient today with continued dyspnea, but related to anxiety requires increased comforting re-assurance for orientation. She was able to ambulate in hallway short distance, and to the bathroom 2x. She requires seated rest breaks throughout due to increased dyspnea although O2 sats on room air 95%. Patent returned to supine. Current Level of Function Impacting Discharge (mobility/balance):  CGA with RW    Other factors to consider for discharge:          PLAN :  Patient continues to benefit from skilled intervention to address the above impairments. Continue treatment per established plan of care. to address goals. Recommendation for discharge: (in order for the patient to meet his/her long term goals)  Therapy up to 5 days/week in SNF setting    This discharge recommendation:  Has been made in collaboration with the attending provider and/or case management    IF patient discharges home will need the following DME: to be determined (TBD)       SUBJECTIVE:   Patient stated I just feel like I am going to explode. I need to get myself together.     OBJECTIVE DATA SUMMARY:   Critical Behavior:  Neurologic State: Alert, Confused  Orientation Level: Oriented X4  Cognition: Follows commands, Poor safety awareness, Decreased attention/concentration     Functional Mobility Training:  Bed Mobility:  Rolling: Stand-by assistance  Supine to Sit: Stand-by assistance              Transfers:  Sit to Stand: Contact guard assistance  Stand to Sit: Contact guard assistance        Bed to Chair: Contact guard assistance                    Balance:     Ambulation/Gait Training:  Distance (ft): 50 Feet (ft)  Assistive Device: Walker, rolling;Gait belt  Ambulation - Level of Assistance: Contact guard assistance                 Therapeutic Exercises:     Pain Rating:  None reported    Activity Tolerance:   Fair    After treatment patient left in no apparent distress:   Supine in bed, Call bell within reach, Bed / chair alarm activated, and Side rails x 3    COMMUNICATION/COLLABORATION:   The patients plan of care was discussed with: Registered nurse.      Marilyn Argueta PT, DPT   Time Calculation: 22 mins

## 2022-12-13 NOTE — DISCHARGE SUMMARY
Physician Discharge Summary     Patient ID:  Leena President  [id number]  80 y.o.  8/1/1930    Admit date: 12/10/2022    Discharge date of service and time: 12/14/2022  Greater than 30 minutes were spent providing discharge related services for this patient    Admission Diagnoses: Dyspnea [R06.00]    Discharge Diagnoses:    Principal Diagnosis     DEBORAH (acute kidney injury)                                             Hospital Course and other diagnoses  DEBORAH (acute kidney injury) / Dehydration - POA presume due to poor PO intake. Hydrated, and improved. May have CKD3. Dyspnea - POA, unclear etiology, better. DDX URI, anemia, debility, anxiety, PE. No SIRS. No hypoxia. Clear lungs on xray. Negative RVP, Flu and COVID. No oxygen requirement with PT      Acute deep venous thrombosis / Elevated d-dimer - POA, new, no hemodynamic compromise or hypoxia. US finds one small DVT in L peroneal vein. VQ scan was intermediate probability. However she has iron deficient anemia and is hemoccult positive. I would not recommend aggressive treatment. He son concurs with plan to not start treatment. Debilitated patient - Worse since admission weeks ago. Needs HH PT OT vs SNF. CM consulted. She is ready to go any time     Anemia / Thrombocytopenia - Low iron on serologies. Gave IV iron now and Rx PO iron at home     HTN (hypertension) - BP remains low normal. Stopped lisinopril     Hypothyroid - TSH normal. Continue synthroid     Dementia / Anxiety and depression / IBS (irritable bowel syndrome) - POA, stable. Continue donepazil and citalopram. Her son is in the room. I do not think a sitter has ever been warranted. I did not order a sitter. PCP: Agatha Parada MD    Consults: None    Significant Diagnostic Studies: See Hospital Course    Discharged to sNF in improved condition.     Discharge Exam:  BP (!) 130/58 (BP 1 Location: Right lower arm, BP Patient Position: At rest)   Pulse 64   Temp 97.6 °F (36.4 °C)   Resp 18   Ht 5' 5\" (1.651 m)   Wt 65.8 kg (145 lb)   SpO2 93%   BMI 24.13 kg/m²      Gen:  Well-developed, well-nourished, in no acute distress  HEENT:  Pink conjunctivae, PERRL, hearing intact to voice, moist mucous membranes  Neck:  Supple, without masses, thyroid non-tender  Resp:  No accessory muscle use, clear breath sounds without wheezes rales or rhonchi  Card:  No murmurs, normal S1, S2 without thrills, bruits or peripheral edema  Abd:  Soft, non-tender, non-distended, normoactive bowel sounds are present, no mass  Lymph:  No cervical or inguinal adenopathy  Musc:  No cyanosis or clubbing  Skin:  No rashes or ulcers, skin turgor is good  Neuro:  Cranial nerves are grossly intact, general motor weakness, follows commands   Psych:  Poor insight, oriented to person, place and time, anxiety    Patient Instructions:   Current Discharge Medication List        CONTINUE these medications which have NOT CHANGED    Details   donepeziL (ARICEPT) 10 mg tablet TAKE 1 TABLET EVERY NIGHT  Qty: 90 Tablet, Refills: 3      levothyroxine (SYNTHROID) 112 mcg tablet TAKE 1 TABLET EVERY DAY BEFORE BREAKFAST  Qty: 90 Tablet, Refills: 3      citalopram (CELEXA) 20 mg tablet TAKE 1 AND 1/2 TABLETS EVERY DAY  Qty: 135 Tablet, Refills: 3    Associated Diagnoses: Depression with anxiety           STOP taking these medications       lisinopriL (PRINIVIL, ZESTRIL) 40 mg tablet Comments:   Reason for Stopping:         simvastatin (ZOCOR) 20 mg tablet Comments:   Reason for Stopping:         aspirin delayed-release 81 mg tablet Comments:   Reason for Stopping:             Activity: Activity as tolerated and PT/OT Eval and Treat  Diet: Regular Diet  Wound Care: None needed    Follow-up with your PCP in a few weeks.   Follow-up tests/labs - none    Signed:  No Hernandez MD  12/14/2022  9:50 AM

## 2022-12-13 NOTE — PROGRESS NOTES
12/13/2022  2:49 PM    Case management note    Patient has been accepted at [facility] . We are awaiting auth. Possible discharge on Wednesday.   Will continue to followl  Sanya Petty

## 2022-12-13 NOTE — PROGRESS NOTES
Problem: Falls - Risk of  Goal: *Absence of Falls  Description: Document [place] Fall Risk and appropriate interventions in the flowsheet. Outcome: Progressing Towards Goal  Note: Fall Risk Interventions:  Mobility Interventions: Bed/chair exit alarm, Patient to call before getting OOB, PT Consult for mobility concerns    Mentation Interventions: Bed/chair exit alarm, Adequate sleep, hydration, pain control, Door open when patient unattended, Gait belt with transfers/ambulation, More frequent rounding, Reorient patient, Room close to nurse's station, Toileting rounds, Update white board    Medication Interventions: Patient to call before getting OOB, Teach patient to arise slowly    Elimination Interventions: Bed/chair exit alarm, Call light in reach, Patient to call for help with toileting needs, Toileting schedule/hourly rounds    History of Falls Interventions: Bed/chair exit alarm         Problem: Patient Education: Go to Patient Education Activity  Goal: Patient/Family Education  Outcome: Progressing Towards Goal     Problem: Pressure Injury - Risk of  Goal: *Prevention of pressure injury  Description: Document Roberto Scale and appropriate interventions in the flowsheet. Outcome: Progressing Towards Goal  Note: Pressure Injury Interventions:  Sensory Interventions: Assess changes in LOC, Assess need for specialty bed, Avoid rigorous massage over bony prominences, Minimize linen layers, Monitor skin under medical devices, Maintain/enhance activity level, Turn and reposition approx. every two hours (pillows and wedges if needed)    Moisture Interventions: Internal/External urinary devices, Absorbent underpads, Limit adult briefs    Activity Interventions: PT/OT evaluation, Increase time out of bed    Mobility Interventions: PT/OT evaluation, Assess need for specialty bed, Turn and reposition approx.  every two hours(pillow and wedges)    Nutrition Interventions: Document food/fluid/supplement intake    Friction and Shear Interventions: Minimize layers, HOB 30 degrees or less, Feet elevated on foot rest                Problem: Patient Education: Go to Patient Education Activity  Goal: Patient/Family Education  Outcome: Progressing Towards Goal

## 2022-12-13 NOTE — PROGRESS NOTES
Problem: Self Care Deficits Care Plan (Adult)  Goal: *Acute Goals and Plan of Care (Insert Text)  Description: FUNCTIONAL STATUS PRIOR TO ADMISSION: Per chart review and patient report, patient lives alone at baseline (recently moved in with DIL and son) and was independent for self care and functional transfers/mobility at baseline requiring increased assist recently     HOME SUPPORT: Patient lives alone at baseline however recently moved in with son and DIL and they provide assist as needed. Occupational Therapy Goals  Initiated 12/12/2022  1. Patient will perform grooming with supervision/set-up within 7 day(s). 2.  Patient will perform upper body bathing/dressing with supervision/set-up within 7 day(s). 3.  Patient will perform lower body bathing/dressing with supervision/set-up within 7 day(s). 4.  Patient will perform toilet transfers with supervision/set-up within 7 day(s). 5.  Patient will perform all aspects of toileting with supervision/set-up within 7 day(s). 6.  Patient will participate in upper extremity therapeutic exercise/activities with supervision/set-up for 10 minutes within 7 day(s). 7.  Patient will utilize energy conservation techniques during functional activities with verbal cues within 7 day(s). Outcome: Progressing Towards Goal   OCCUPATIONAL THERAPY TREATMENT  Patient: Chucho Reinoso (80 y.o. female)  Date: 12/13/2022  Diagnosis: Dyspnea [R06.00] <principal problem not specified>      Precautions:    Chart, occupational therapy assessment, plan of care, and goals were reviewed. ASSESSMENT  Patient continues with skilled OT services and is progressing towards goals. Pt needing to use the restroom. She ambulated and transfers to commode to manage her clothes contact guard, stand by assist for hygiene. Pt dyspneic throughout tx however sats 94% on room air. Pt  returned to sit in chair, son visiting.       Current Level of Function Impacting Discharge (ADLs): contact guard commode transfers and cloth mgt, stand by assist hygiene    Other factors to consider for discharge:          PLAN :  Patient continues to benefit from skilled intervention to address the above impairments. Continue treatment per established plan of care to address goals. Recommend with staff: out of bed for ADl's, there ex, there act, meals    Recommend next OT session: cont towards goals    Recommendation for discharge: (in order for the patient to meet his/her long term goals)  Therapy up to 5 days/week in SNF setting    This discharge recommendation:  Has not yet been discussed the attending provider and/or case management    IF patient discharges home will need the following DME:        SUBJECTIVE:   Patient stated I better get going.     OBJECTIVE DATA SUMMARY:   Cognitive/Behavioral Status:  Neurologic State: Alert;Confused  Orientation Level: Oriented X4  Cognition: Follows commands;Poor safety awareness;Decreased attention/concentration             Functional Mobility and Transfers for ADLs:  Bed Mobility:  Rolling: Stand-by assistance  Supine to Sit: Stand-by assistance    Transfers:  Sit to Stand: Contact guard assistance  Functional Transfers  Toilet Transfer : Contact guard assistance  Bed to Chair: Contact guard assistance    Balance: standing balance impaired       ADL Intervention:       Grooming  Grooming Assistance: Contact guard assistance  Position Performed: Standing  Washing Hands: Contact guard assistance  Brushing/Combing Hair: Contact guard assistance          Toileting  Bladder Hygiene: Stand-by assistance  Clothing Management: Contact guard assistance         Activity Tolerance:   Fair    After treatment patient left in no apparent distress:   Sitting in chair and Call bell within reach    COMMUNICATION/COLLABORATION:   The patients plan of care was discussed with: Occupational therapist.     CHIP Franco  Time Calculation: 15 mins

## 2022-12-13 NOTE — PROGRESS NOTES
[facility]  [address], [place] [address]  [phone]    Hospitalist Progress Note      NAME: Robin Hedrick   :  1930  MRM:  [id number]    Date/Time of service 2022  11:43 AM          Assessment and Plan:     Dyspnea - POA, unclear etiology, better. DDX URI, anemia, debility, anxiety, PE. No SIRS. No hypoxia. Clear lungs on xray. Negative RVP, Flu and COVID. No oxygen requirement with PT      DEBORAH (acute kidney injury) / Dehydration - POA presume due to poor PO intake. Hydrated, and improved. May have CKD3. Acute deep venous thrombosis / Elevated d-dimer - POA, new, no hemodynamic compromise or hypoxia. US finds one small DVT in L peroneal vein. VQ scan was intermediate probability. However she has iron deficient anemia and is hemoccult positive. I would not recommend aggressive treatment. He son concurs with plan to not start treatment. Debilitated patient - Worse since admission weeks ago. Needs HH PT OT vs SNF. CM consulted. She is ready to go any time    Anemia / Thrombocytopenia - Low iron on serologies. Gave IV iron now and Rx PO iron at home    HTN (hypertension) - BP remains low normal. Stopped lisinopril    Hypothyroid - TSH normal. Continue synthroid    Dementia / Anxiety and depression / IBS (irritable bowel syndrome) - POA, stable. Continue donepazil and citalopram       Subjective:     Chief Complaint:  weak    ROS:  (bold if positive, if negative)    SOB/DOETolerating some PT  Tolerating Diet        Objective:     Last 24hrs VS reviewed since prior progress note.  Most recent are:    Visit Vitals  BP (!) 101/57   Pulse 63   Temp 97.8 °F (36.6 °C)   Resp 20   Ht 5' 5\" (1.651 m)   Wt 65.8 kg (145 lb)   SpO2 96%   BMI 24.13 kg/m²     SpO2 Readings from Last 6 Encounters:   22 96%   12 100%   12 98%          Intake/Output Summary (Last 24 hours) at 2022 0800  Last data filed at 2022 0625  Gross per 24 hour   Intake 180 ml   Output 475 ml   Net -295 ml          Physical Exam:    Gen:  Well-developed, well-nourished, in no acute distress  HEENT:  Pink conjunctivae, PERRL, hearing intact to voice, moist mucous membranes  Neck:  Supple, without masses, thyroid non-tender  Resp:  No accessory muscle use, clear breath sounds without wheezes rales or rhonchi  Card:  No murmurs, normal S1, S2 without thrills, bruits or peripheral edema  Abd:  Soft, non-tender, non-distended, normoactive bowel sounds are present, no mass  Lymph:  No cervical or inguinal adenopathy  Musc:  No cyanosis or clubbing  Skin:  No rashes or ulcers, skin turgor is good  Neuro:  Cranial nerves are grossly intact, general motor weakness, follows commands   Psych:  Poor insight, oriented to person, place and time, anxiety    Telemetry reviewed:   normal sinus rhythm  __________________________________________________________________  Medications Reviewed: (see below)  Medications:     Current Facility-Administered Medications   Medication Dose Route Frequency    albuterol-ipratropium (DUO-NEB) 2.5 MG-0.5 MG/3 ML  3 mL Nebulization Q6H PRN    apixaban (ELIQUIS) tablet 2.5 mg  2.5 mg Oral BID    sodium chloride (NS) flush 5-40 mL  5-40 mL IntraVENous Q8H    sodium chloride (NS) flush 5-40 mL  5-40 mL IntraVENous PRN    acetaminophen (TYLENOL) tablet 650 mg  650 mg Oral Q6H PRN    polyethylene glycol (MIRALAX) packet 17 g  17 g Oral DAILY PRN    ondansetron (ZOFRAN ODT) tablet 4 mg  4 mg Oral Q8H PRN    citalopram (CELEXA) tablet 20 mg  20 mg Oral DAILY    donepeziL (ARICEPT) tablet 10 mg  10 mg Oral QHS    levothyroxine (SYNTHROID) tablet 100 mcg  100 mcg Oral 6am        Lab Data Reviewed: (see below)  Lab Review:     Recent Labs     12/13/22  0529 12/12/22  0025 12/11/22  0401   WBC 6.5 8.4 3.6   HGB 9.6* 8.8* 8.9*   HCT 29.1* 27.1* 27.5*   * 148* 130*       Recent Labs     12/13/22  0529 12/12/22  0025 12/11/22  0401 12/10/22  1150    136 137 137   K 4.1 3.8 3.9 4.1    102 103 106   CO2 23 26 25 24   * 105* 258* 79   BUN 34* 44* 36* 33*   CREA 1.09* 1.24* 1.09* 1.16*   CA 7.9* 7.8* 7.7* 7.7*   MG 2.0 2.1 2.5*  --    ALB  --   --   --  3.0*   TBILI  --   --   --  0.5   ALT  --   --   --  18       No results found for: GLUCPOC  No results for input(s): PH, PCO2, PO2, HCO3, FIO2 in the last 72 hours. No results for input(s): INR, INREXT, INREXT in the last 72 hours. All Micro Results       Procedure Component Value Units Date/Time    RESPIRATORY VIRUS PANEL W/COVID-19, PCR [[id number]] Collected: 12/10/22 1355    Order Status: Completed Specimen: Nasopharyngeal Updated: 12/10/22 1816     Adenovirus Not detected        Coronavirus 229E Not detected        Coronavirus HKU1 Not detected        Coronavirus CVNL63 Not detected        Coronavirus OC43 Not detected        SARS-CoV-2, PCR Not detected        Metapneumovirus Not detected        Rhinovirus and Enterovirus Not detected        Influenza A Not detected        Influenza B Not detected        Parainfluenza 1 Not detected        Parainfluenza 2 Not detected        Parainfluenza 3 Not detected        Parainfluenza virus 4 Not detected        RSV by PCR Not detected        B. parapertussis, PCR Not detected        Bordetella pertussis - PCR Not detected        Chlamydophila pneumoniae DNA, QL, PCR Not detected        Mycoplasma pneumoniae DNA, QL, PCR Not detected       COVID-19 RAPID TEST [[id number]] Collected: 12/10/22 1318    Order Status: Completed Specimen: Nasopharyngeal Updated: 12/10/22 1341     Specimen source Nasopharyngeal        COVID-19 rapid test Not detected        Comment: Rapid Abbott ID Now       Rapid NAAT:  The specimen is NEGATIVE for SARS-CoV-2, the novel coronavirus associated with COVID-19.        Negative results should be treated as presumptive and, if inconsistent with clinical signs and symptoms or necessary for patient management, should be tested with an alternative molecular assay. Negative results do not preclude SARS-CoV-2 infection and should not be used as the sole basis for patient management decisions. This test has been authorized by the FDA under an Emergency Use Authorization (EUA) for use by authorized laboratories. Fact sheet for Healthcare Providers:  http://www.yesenia.shailesh/  Fact sheet for Patients: http://www.yesenia.shailesh/       Methodology: Isothermal Nucleic Acid Amplification         URINE CULTURE HOLD SAMPLE [[id number]] Collected: 12/10/22 1255    Order Status: Completed Specimen: Urine Updated: 12/10/22 1258     Urine culture hold       Urine on hold in Microbiology dept for 2 days. If unpreserved urine is submitted, it cannot be used for addtional testing after 24 hours, recollection will be required. Other pertinent lab: none    Total time spent with patient: 30 Minutes I personally reviewed chart, notes, data and current medications in the medical record. I have personally examined and treated the patient at bedside during this period. To assist coordination of care and communication with nursing and staff, this note may be preliminary early in the day, but finalized by end of the day.                  Care Plan discussed with: Patient, Care Manager, Nursing Staff, and >50% of time spent in counseling and coordination of care    Discussed:  Care Plan and D/C Planning    Prophylaxis:  Lovenox and H2B/PPI    Disposition:  Home w/Family           ___________________________________________________    Attending Physician: Dimitris Pimentel MD

## 2022-12-14 VITALS
HEIGHT: 65 IN | DIASTOLIC BLOOD PRESSURE: 57 MMHG | OXYGEN SATURATION: 94 % | RESPIRATION RATE: 16 BRPM | SYSTOLIC BLOOD PRESSURE: 114 MMHG | HEART RATE: 63 BPM | BODY MASS INDEX: 24.16 KG/M2 | WEIGHT: 145 LBS | TEMPERATURE: 97.6 F

## 2022-12-14 LAB
CALCULATED R AXIS, ECG10: -47 DEGREES
CALCULATED T AXIS, ECG11: -26 DEGREES
COVID-19 RAPID TEST, COVR: NOT DETECTED
DIAGNOSIS, 93000: NORMAL
MAGNESIUM SERPL-MCNC: 2.1 MG/DL (ref 1.6–2.4)
Q-T INTERVAL, ECG07: 460 MS
QRS DURATION, ECG06: 128 MS
QTC CALCULATION (BEZET), ECG08: 419 MS
SOURCE, COVRS: NORMAL
VENTRICULAR RATE, ECG03: 50 BPM

## 2022-12-14 PROCEDURE — 74011000250 HC RX REV CODE- 250: Performed by: INTERNAL MEDICINE

## 2022-12-14 PROCEDURE — 36415 COLL VENOUS BLD VENIPUNCTURE: CPT

## 2022-12-14 PROCEDURE — 74011250637 HC RX REV CODE- 250/637: Performed by: INTERNAL MEDICINE

## 2022-12-14 PROCEDURE — 87635 SARS-COV-2 COVID-19 AMP PRB: CPT

## 2022-12-14 PROCEDURE — 83735 ASSAY OF MAGNESIUM: CPT

## 2022-12-14 PROCEDURE — 94640 AIRWAY INHALATION TREATMENT: CPT

## 2022-12-14 RX ADMIN — ACETAMINOPHEN 650 MG: 325 TABLET, FILM COATED ORAL at 06:50

## 2022-12-14 RX ADMIN — Medication 10 ML: at 06:47

## 2022-12-14 RX ADMIN — LEVOTHYROXINE SODIUM 100 MCG: 0.1 TABLET ORAL at 06:47

## 2022-12-14 RX ADMIN — APIXABAN 2.5 MG: 2.5 TABLET, FILM COATED ORAL at 09:09

## 2022-12-14 RX ADMIN — IPRATROPIUM BROMIDE AND ALBUTEROL SULFATE 3 ML: .5; 3 SOLUTION RESPIRATORY (INHALATION) at 09:49

## 2022-12-14 RX ADMIN — CITALOPRAM HYDROBROMIDE 20 MG: 20 TABLET ORAL at 09:09

## 2022-12-14 NOTE — PROGRESS NOTES
[facility]. Reviewed discharge instructions Opportunity for questions and clarification offered. Removed patients IV access with no complications, VS stable, and patient sent with all belongings. discharge instructions reviewed with son who will be transporting patient to facility. Envelope given to son to give to facility. Foam dressing applied to patient's left lower leg regarding skin tear.

## 2022-12-14 NOTE — PROGRESS NOTES
12/14/2022  10:56 AM  Transition of Care Plan to SNF/Rehab    SNF/Rehab Transition:  Patient has been accepted to [facility] and meets criteria for admission. Patrick Goncalves has been received. Rapid COVID test requested from nursing. Patient will transported by her son and he is in room. Communication to Patient/Family:  Met with patient and pt's son (identified care giver) and they are agreeable to the transition plan. Communication to SNF/Rehab:  Bedside RN, Nikki Simon, has been notified to update the transition plan to the facility and call report (phone number [phone], [address], ). Discharge information has been updated on the AVS.     Discharge instructions to be fax'd to facility via [facility]. Nursing Please include all hard scripts for controlled substances, med rec and dc summary, and AVS in packet. Nursing, please discuss the following applicable information with [facility] nursing during report:     [employer] with (X) only those applicable:    Medication:  []  Medications will be available at the facility  []  IV Antibiotics   []  Controlled Substance - hard copy to be sent with patient   []  Weekly Labs   Documents:  [] Hard RX  [] MAR  [] Kardex  [] AVS  []Transfer Summary  []Discharge   Equipment:  []  CPAP/BiPAP  []  Wound Vacuum  []  Yost or Urinary Device  []  PICC/Central Line  []  Nebulizer  []  Ventilator   Treatment:  []Isolation (for MRSA, VRE, etc.)  []Surgical Drain Management  []Tracheostomy Care  []Dressing Changes  []Dialysis with transportation and chair time. []PEG Care  []Oxygen  []Daily Weights for Heart Failure   Dietary:  []Any diet limitations  []Tube Feedings   []Total Parenteral Management (TPN)   Eligible for Medicaid Long Term Services and Supports  Yes:  [] Eligible for medical assistance or will become eligible within 180 days and UAI completed. [] Provider/Patient and/or support system has requested screening.   [] UAI copy provided to patient or responsible party. [] UAI unavailable at discharge will send once processed to SNF provider. [] UAI unavailable at discharged mailed to patient  No:   [x] Private pay and is not financially eligible for Medicaid within the next 180 days. [] Reside out-of-state. [] A residents of a state owned/operated facility that is licensed  by [address] and [employer] or [facility]  [] Enrollment in [facility] hospice services  [] [facility]  [] Patient /Family declines to have screening completed or provide financial information for screening     Financial Resources:  Medicaid    [] Initiated and application pending   [] Full coverage     Advanced Care Plan:  []Surrogate Decision Maker of Care  []POA  []Communicated Code Status (DDNR\", \"Full\")    Other  Care Management Interventions  PCP Verified by CM: Yes (March 2022)  Mode of Transport at Discharge:  Other (see comment) (son will transport at [facility])  Transition of Care Consult (CM Consult): Discharge Planning  Discharge Durable Medical Equipment: No  Physical Therapy Consult: Yes  Occupational Therapy Consult: Yes  Speech Therapy Consult: No  Support Systems: Child(edilia)  Confirm Follow Up Transport: Family  The Patient and/or Patient Representative was Provided with a Choice of Provider and Agrees with the Discharge Plan?: Yes  Name of the Patient Representative Who was Provided with a Choice of Provider and Agrees with the Discharge Plan: son and patient  Freedom of Choice List was Provided with Basic Dialogue that Supports the Patient's Individualized Plan of Care/Goals, Treatment Preferences and Shares the Quality Data Associated with the Providers?: Yes  Discharge Location  Patient Expects to be Discharged to[de-identified] Skilled nursing facility

## 2022-12-14 NOTE — PROGRESS NOTES
12/14/22      Medicare pt has received Medicare 2nd IMM Letter. Detailed information was provided to patient.  Patient was unable to sign letter

## 2022-12-14 NOTE — PROGRESS NOTES
[facility]  [address], [facility] [address]  [phone]    Hospitalist Progress Note      NAME: Thea Lei   :  1930  MRM:  [id number]    Date/Time of service 2022  9:49 AM          Assessment and Plan:     Dyspnea - POA, unclear etiology, better. DDX URI, anemia, debility, anxiety, PE. No SIRS. No hypoxia. Clear lungs on xray. Negative RVP, Flu and COVID. No oxygen requirement with PT      DEBORAH (acute kidney injury) / Dehydration - POA presume due to poor PO intake. Hydrated, and improved. May have CKD3. Acute deep venous thrombosis / Elevated d-dimer - POA, new, no hemodynamic compromise or hypoxia. US finds one small DVT in L peroneal vein. VQ scan was intermediate probability. However she has iron deficient anemia and is hemoccult positive. I would not recommend aggressive treatment. He son concurs with plan to not start treatment. Debilitated patient - Worse since admission weeks ago. Needs HH PT OT vs SNF. CM consulted. She is ready to go any time    Anemia / Thrombocytopenia - Low iron on serologies. Gave IV iron now and Rx PO iron at home    HTN (hypertension) - BP remains low normal. Stopped lisinopril    Hypothyroid - TSH normal. Continue synthroid    Dementia / Anxiety and depression / IBS (irritable bowel syndrome) - POA, stable. Continue donepazil and citalopram. Her son is in the room. I do not think a sitter has ever been warranted. Subjective:     Chief Complaint:  weakness stable    ROS:  (bold if positive, if negative)    SOB/DOETolerating some PT  Tolerating Diet        Objective:     Last 24hrs VS reviewed since prior progress note.  Most recent are:    Visit Vitals  BP (!) 130/58 (BP 1 Location: Right lower arm, BP Patient Position: At rest)   Pulse 64   Temp 97.6 °F (36.4 °C)   Resp 18   Ht 5' 5\" (1.651 m)   Wt 65.8 kg (145 lb)   SpO2 93%   BMI 24.13 kg/m²     SpO2 Readings from Last 6 Encounters:   22 93% 08/23/12 100%   08/08/12 98%          Intake/Output Summary (Last 24 hours) at 12/14/2022 0758  Last data filed at 12/14/2022 0404  Gross per 24 hour   Intake 390 ml   Output 2 ml   Net 388 ml          Physical Exam:    Gen:  Well-developed, well-nourished, in no acute distress  HEENT:  Pink conjunctivae, PERRL, hearing intact to voice, moist mucous membranes  Neck:  Supple, without masses, thyroid non-tender  Resp:  No accessory muscle use, clear breath sounds without wheezes rales or rhonchi  Card:  No murmurs, normal S1, S2 without thrills, bruits or peripheral edema  Abd:  Soft, non-tender, non-distended, normoactive bowel sounds are present, no mass  Lymph:  No cervical or inguinal adenopathy  Musc:  No cyanosis or clubbing  Skin:  No rashes or ulcers, skin turgor is good  Neuro:  Cranial nerves are grossly intact, general motor weakness, follows commands   Psych:  Poor insight, oriented to person, place and time, anxiety    Telemetry reviewed:   normal sinus rhythm  __________________________________________________________________  Medications Reviewed: (see below)  Medications:     Current Facility-Administered Medications   Medication Dose Route Frequency    albuterol-ipratropium (DUO-NEB) 2.5 MG-0.5 MG/3 ML  3 mL Nebulization Q6H PRN    apixaban (ELIQUIS) tablet 2.5 mg  2.5 mg Oral BID    sodium chloride (NS) flush 5-40 mL  5-40 mL IntraVENous Q8H    sodium chloride (NS) flush 5-40 mL  5-40 mL IntraVENous PRN    acetaminophen (TYLENOL) tablet 650 mg  650 mg Oral Q6H PRN    polyethylene glycol (MIRALAX) packet 17 g  17 g Oral DAILY PRN    ondansetron (ZOFRAN ODT) tablet 4 mg  4 mg Oral Q8H PRN    citalopram (CELEXA) tablet 20 mg  20 mg Oral DAILY    donepeziL (ARICEPT) tablet 10 mg  10 mg Oral QHS    levothyroxine (SYNTHROID) tablet 100 mcg  100 mcg Oral 6am        Lab Data Reviewed: (see below)  Lab Review:     Recent Labs     12/13/22  0529 12/12/22  0025   WBC 6.5 8.4   HGB 9.6* 8.8*   HCT 29.1* 27.1* * 148*       Recent Labs     12/14/22  0441 12/13/22  0529 12/12/22  0025   NA  --  136 136   K  --  4.1 3.8   CL  --  107 102   CO2  --  23 26   GLU  --  101* 105*   BUN  --  34* 44*   CREA  --  1.09* 1.24*   CA  --  7.9* 7.8*   MG 2.1 2.0 2.1       No results found for: GLUCPOC  No results for input(s): PH, PCO2, PO2, HCO3, FIO2 in the last 72 hours. No results for input(s): INR, INREXT, INREXT in the last 72 hours. All Micro Results       Procedure Component Value Units Date/Time    RESPIRATORY VIRUS PANEL W/COVID-19, PCR [[id number]] Collected: 12/10/22 1355    Order Status: Completed Specimen: Nasopharyngeal Updated: 12/10/22 1816     Adenovirus Not detected        Coronavirus 229E Not detected        Coronavirus HKU1 Not detected        Coronavirus CVNL63 Not detected        Coronavirus OC43 Not detected        SARS-CoV-2, PCR Not detected        Metapneumovirus Not detected        Rhinovirus and Enterovirus Not detected        Influenza A Not detected        Influenza B Not detected        Parainfluenza 1 Not detected        Parainfluenza 2 Not detected        Parainfluenza 3 Not detected        Parainfluenza virus 4 Not detected        RSV by PCR Not detected        B. parapertussis, PCR Not detected        Bordetella pertussis - PCR Not detected        Chlamydophila pneumoniae DNA, QL, PCR Not detected        Mycoplasma pneumoniae DNA, QL, PCR Not detected       COVID-19 RAPID TEST [[id number]] Collected: 12/10/22 1318    Order Status: Completed Specimen: Nasopharyngeal Updated: 12/10/22 1341     Specimen source Nasopharyngeal        COVID-19 rapid test Not detected        Comment: Rapid Abbott ID Now       Rapid NAAT:  The specimen is NEGATIVE for SARS-CoV-2, the novel coronavirus associated with COVID-19.        Negative results should be treated as presumptive and, if inconsistent with clinical signs and symptoms or necessary for patient management, should be tested with an alternative molecular assay.  Negative results do not preclude SARS-CoV-2 infection and should not be used as the sole basis for patient management decisions. This test has been authorized by the FDA under an Emergency Use Authorization (EUA) for use by authorized laboratories. Fact sheet for Healthcare Providers:  http://www.yesenia.shailesh/  Fact sheet for Patients: http://www.yesenia.shailesh/       Methodology: Isothermal Nucleic Acid Amplification         URINE CULTURE HOLD SAMPLE [[id number]] Collected: 12/10/22 1255    Order Status: Completed Specimen: Urine Updated: 12/10/22 1258     Urine culture hold       Urine on hold in Microbiology dept for 2 days. If unpreserved urine is submitted, it cannot be used for addtional testing after 24 hours, recollection will be required. Other pertinent lab: none    Total time spent with patient: 30 Minutes I personally reviewed chart, notes, data and current medications in the medical record. I have personally examined and treated the patient at bedside during this period. To assist coordination of care and communication with nursing and staff, this note may be preliminary early in the day, but finalized by end of the day.                  Care Plan discussed with: Patient, Care Manager, Nursing Staff, and >50% of time spent in counseling and coordination of care    Discussed:  Care Plan and D/C Planning    Prophylaxis:  Lovenox and H2B/PPI    Disposition:  Home w/Family           ___________________________________________________    Attending Physician: Linda Turner MD

## 2022-12-15 NOTE — ADT AUTH CERT NOTES
Renal Failure, Acute - Care Day 3 (12/12/2022) by Radha Jaramillo       Review Status Review Entered   Completed 12/14/2022 1050       Created By   Radha Jaramillo      Criteria Review      Care Day: 3 Care Date: 12/12/2022 Level of Care: Telemetry    Guideline Day 2    Level Of Care    (X) Floor    12/14/2022 10:50:49 EST by Radha Jaramillo      12/12 remote tele    Clinical Status    ( ) * Electrolyte abnormalities absent or improved    12/14/2022 10:50:49 EST by jose luis Monge      Calcium: 7.8 (L)    (X) * Acid-base abnormalities absent or improved    12/14/2022 10:50:49 EST by Radha Jaramillo      absent    ( ) * Hypotension absent    12/14/2022 10:50:49 EST by Clarence Barry      101/57 114/52    Activity    (X) Activity as tolerated    12/14/2022 10:50:49 EST by Radha Jaramillo      Up with Assistance; Bath Room Privileges    Routes    (X) Parenteral or oral hydration    12/14/2022 10:50:49 EST by Radha Jaramillo      tolerating po    (X) Parenteral or oral medications    12/14/2022 10:50:49 EST by Clarence Barry      po tylenol 650mg q6 prn x 1  po eliquis 2.5mg bid  po celexa 20mg qd  po aricept 10mg hs  po synthroid 100mcg 6am  iv technetium albumin aggregated (MAA) solution 4.4 millicurie x1    (X) Oral diet as tolerated    12/14/2022 10:50:49 EST by jose luis Monge      ADULT DIET Regular    Interventions    (X) Monitor electrolytes, renal function tests, acid-base, and volume status    12/14/2022 10:50:49 EST by jose luis Monge      BUN: 44 (H)  Creatinine: 1.24 (H)  BUN/Creatinine ratio: 35 (H)  eGFR: 41 (L)    Medications    (X) Possible medical therapies    12/14/2022 10:50:49 EST by Radha Jaramillo      OT/PT consults    * Milestone   Additional Notes    DATE: 12/12 remote tele       Pertinent Updates:   Patient complaining of lower abdominal pain. Had a bowel movement and she still states that Her abdomen feels \"funny, like she is passing a stone\". Vitals:   97.6 °F (36.4 °C) 53 109/57 20 96 % RA   101/57 114/52      Abnl/Pertinent Labs/Radiology/Diagnostic Studies:   RBC: 2.77 (L)   HGB: 8.8 (L)   HCT: 27.1 (L)   PLATELET: 711 (L)   IMMATURE GRANULOCYTES: 1 (H)   ABS. IMM. GRANS.: 0.1 (H)   Glucose: 105 (H)      Physical Exam:   No changes noted      MD Consults/Assessments & Plans:   Per Attending:   DEBORAH (acute kidney injury) / Dehydration - POA presume due to poor PO intake. Hydrated, but it did not improve. May have CKD3. Debilitated patient - Worse since admission weeks ago. Needs HH PT OT vs SNF. CM consulted. Anemia / Thrombocytopenia - Low iron on serologies. Give IV iron now and PO iron at home    HTN (hypertension) - BP low normal. Stopped lisinopril    Hypothyroid - TSH normal. Continue synthroid    Dementia / Anxiety and depression / IBS (irritable bowel syndrome) - POA, stable. Continue donepazil and citalopram      Medications:   duo-neb 3ml q6 prn x1      OT:   Pt presents with decreased balance, decreased activity tolerance, decreased safety awareness, generalized weakness and increased need for assist with self care (SBA grooming seated on commode, CGA washing hands at sink, mod A toileting during stance for thoroughness after BM) and functional transfers/mobility (SBA sup->sit and scooting EOB, CGA sit<->stand and toilet transfer with RW and amaury HHA and on way back to recliner use of RW with cueing for safe walker management in bathroom). Patient noted with decreased short term memory asking every few minutes \"what is wrong with me; where am I?; do my son's know where I am?.\" Patient would benefit from continued skilled OT services while at [facility] in order to increase safety and independence with self care and functional transfers/mobility. Recommend discharge to SNF as patient below baseline functional level. Functional Outcome Measure:   The patient scored Total: 55/100 on the Barthel Index outcome measure         PT:   Based on the objective data described below, the patient presents with generalized weakness, poor short term memory and confusion, impaired dynamic standing balance, decreased activity tolerance, and gait dysfunction likely not fully consistent with baseline functional mobility level s/p admission for dyspnea. Patient oriented only to self and being in a hospital but confused throughout session and repeating the same questions over and over. Very pleasant and cooperative, however. Patient overall needing only CGA for mobility, up to MIN for gait due to never using RW prior to now per patient. Unsure of baseline level of mobility but reports not using any AD, family indicates difficulty with assisting her upstairs to 2nd floor bedroom. Recommend SNF rehab to maximize strength and endurance to allow improved disposition options with less caregiver assistance. Current Level of Function Impacting Discharge (mobility/balance): CGA to MIN A gait x 15' and transfers. Functional Outcome Measure: The patient scored Total: 55/100 on the Barthel Index outcome measure which is indicative of being partially dependent in basic self-care. CM:   CM met with patient's son at bedside to provide update on SNF referrals. Referral to [facility] is still pending. [facility] has accepted and will begin insurance authorization.              Renal Failure, Acute - Care Day 2 (12/11/2022) by Kiah Schuler       Review Status Review Entered   Completed 12/14/2022 0945       Created By   Kiah Schuler      Criteria Review      Care Day: 2 Care Date: 12/11/2022 Level of Care: Telemetry    Guideline Day 2    Level Of Care    (X) Floor    12/14/2022 09:45:31 EST by Jacqueline Kaufman      Remote Telemetry    Clinical Status    ( ) * Electrolyte abnormalities absent or improved    12/14/2022 09:45:31 EST by Leslie Mora      Sodium: 137  Potassium: 3.9  Chloride: 103  Calcium: 7.7 (L)  Magnesium: 2.5 (H)    (X) * Acid-base abnormalities absent or improved    (X) * Hypotension absent    12/14/2022 09:45:31 EST by Claudette Nunez      BP: 160/70, 184/36, 152/48, 148/54    Activity    ( ) Activity as tolerated    12/14/2022 09:45:31 EST by Claudette Nunez      patient mostly seen in bed    Routes    (X) Parenteral or oral hydration    (X) Parenteral or oral medications    12/14/2022 09:45:31 EST by Leslie Chandler      TYLENOL 650mg Q6H PRN PO X1  ELIQUIS 2.5mg BID PO  CELEXA 20mg QD PO  ARICEPT 10mg QHS PO  SYNTHROID 100mcg 6AM PO  LASIX 40mg IV X1  MUCINEX 1,200mg PO X1  SOLU-MEDROL 40 mg Q6H IV x2    (X) Oral diet as tolerated    12/14/2022 09:45:31 EST by Leslie Carvajal      ADULT DIET Regular    Interventions    (X) Monitor electrolytes, renal function tests, acid-base, and volume status    12/14/2022 09:45:31 EST by Leslie Carvajal      BUN: 36 (H)  Creatinine: 1.09 (H)  BUN/Creatinine ratio: 33 (H)    Medications    ( ) Possible medical therapies    12/14/2022 09:45:31 EST by Berdine Killeen CATHETER    * Milestone   Additional Notes    DATE: 12/11/22         Vitals:   T: 98.3   NV: 55, 51, 64 - synus manpreet; synus rhythm   RR: 24   SPO2: 97   WT: 65.8 kg      Abnl/Pertinent Labs/Radiology/Diagnostic Studies:      12/11/22 04:01   RBC: 2.77 (L)   HGB: 8.9 (L)   HCT: 27.5 (L)   MCV: 99.3 (H)   PLATELET: 888 (L)   NEUTROPHILS: 91 (H)   LYMPHOCYTES: 7 (L)   MONOCYTES: 1 (L)   IMMATURE GRANULOCYTES: 1 (H)   ABS. LYMPHOCYTES: 0.3 (L)   Glucose: 258 (H)   BUN: 36 (H)   Creatinine: 1.09 (H)   BUN/Creatinine ratio: 33 (H)   Calcium: 7.7 (L)   Magnesium: 2.5 (H)   eGFR: 48 (L)      12/11/22 10:01   Cholesterol, total: 238 (H)   LDL, calculated: 128.4 (H)   Iron: 25 (L)   TIBC: 312   Iron % saturation: 8 (L)         ECHO ADULT COMPLETE    Result status: Final result       ·  Left Ventricle: Low normal left ventricular systolic function with a visually estimated EF of 50 - 55%. Left ventricle size is normal. Normal wall thickness. Normal wall motion.  Normal diastolic function. ·  Aortic Valve: Mild regurgitation. ·  Left Atrium: Left atrium is dilated. Physical Exam:   Gen:  Well-developed, well-nourished, in no acute distress   HEENT:  Pink conjunctivae, PERRL, hearing intact to voice, moist mucous membranes   Neck:  Supple, without masses, thyroid non-tender   Resp:  No accessory muscle use, clear breath sounds without wheezes rales or rhonchi   Card:  No murmurs, normal S1, S2 without thrills, bruits or peripheral edema   Abd:  Soft, non-tender, non-distended, normoactive bowel sounds are present, no mass   Lymph:  No cervical or inguinal adenopathy   Musc:  No cyanosis or clubbing   Skin:  No rashes or ulcers, skin turgor is good   Neuro:  Cranial nerves are grossly intact, general motor weakness, follows commands    Psych:  Poor insight, oriented to person, place and time, anxiety               HOSPITALIST Assessments & Plans:          Dyspnea - POA, unclear etiology. DDX URI, anemia, debility, anxiety, PE. No SIRS. No hypoxia. Clear lungs on xray. Negative RVP, Flu and COVID. Check oxygen requirement with PT        DEBORAH (acute kidney injury) / Dehydration - POA presume due to poor PO intake. Hydrate. Acute deep venous thrombosis / Elevated d-dimer - POA, new, no hemodynamic compromise or hypoxia. US finds one small DVT in L peroneal vein. VQ scan to check lungs. If low prob, I would not recommend aggressive treatment. Otherwise low dose eliquis. Debilitated patient - Worse since admission weeks ago. Needs HH PT OT. CM consulted. Anemia / Thrombocytopenia - Check serologies and hemoccult. HTN (hypertension) - BP low normal. Stop lisinopril       Hypothyroid - TSH normal. Continue synthroid       Dementia / Anxiety and depression / IBS (irritable bowel syndrome) - POA, stable.  Continue donepazil and citalopram            Orders:   Assess skin per unit guidelines CONTINUOUS        Maintain HOB at the lowest elevation consistent with medical plan of care CONTINUOUS     VITAL SIGNS CONTINUOUS       CARDIAC MONITORING

## 2022-12-31 ENCOUNTER — APPOINTMENT (OUTPATIENT)
Dept: GENERAL RADIOLOGY | Age: 87
DRG: 682 | End: 2022-12-31
Attending: EMERGENCY MEDICINE
Payer: MEDICARE

## 2022-12-31 ENCOUNTER — HOSPITAL ENCOUNTER (INPATIENT)
Age: 87
LOS: 4 days | Discharge: NURSING, REHAB OR LONG-TERM CARE | DRG: 682 | End: 2023-01-06
Attending: EMERGENCY MEDICINE | Admitting: INTERNAL MEDICINE
Payer: MEDICARE

## 2022-12-31 ENCOUNTER — APPOINTMENT (OUTPATIENT)
Dept: CT IMAGING | Age: 87
DRG: 682 | End: 2022-12-31
Attending: EMERGENCY MEDICINE
Payer: MEDICARE

## 2022-12-31 DIAGNOSIS — R53.1 WEAKNESS: ICD-10-CM

## 2022-12-31 DIAGNOSIS — N17.9 AKI (ACUTE KIDNEY INJURY) (HCC): ICD-10-CM

## 2022-12-31 DIAGNOSIS — T07.XXXA MULTIPLE CONTUSIONS: ICD-10-CM

## 2022-12-31 DIAGNOSIS — S09.90XA CLOSED HEAD INJURY, INITIAL ENCOUNTER: Primary | ICD-10-CM

## 2022-12-31 PROCEDURE — 74011250636 HC RX REV CODE- 250/636: Performed by: EMERGENCY MEDICINE

## 2022-12-31 PROCEDURE — 96374 THER/PROPH/DIAG INJ IV PUSH: CPT

## 2022-12-31 PROCEDURE — 70486 CT MAXILLOFACIAL W/O DYE: CPT

## 2022-12-31 PROCEDURE — 72125 CT NECK SPINE W/O DYE: CPT

## 2022-12-31 PROCEDURE — 99285 EMERGENCY DEPT VISIT HI MDM: CPT

## 2022-12-31 PROCEDURE — 71250 CT THORAX DX C-: CPT

## 2022-12-31 PROCEDURE — 72170 X-RAY EXAM OF PELVIS: CPT

## 2022-12-31 PROCEDURE — 70450 CT HEAD/BRAIN W/O DYE: CPT

## 2022-12-31 RX ORDER — HYDROMORPHONE HYDROCHLORIDE 1 MG/ML
0.5 INJECTION, SOLUTION INTRAMUSCULAR; INTRAVENOUS; SUBCUTANEOUS ONCE
Status: COMPLETED | OUTPATIENT
Start: 2022-12-31 | End: 2022-12-31

## 2022-12-31 RX ADMIN — HYDROMORPHONE HYDROCHLORIDE 0.5 MG: 1 INJECTION, SOLUTION INTRAMUSCULAR; INTRAVENOUS; SUBCUTANEOUS at 22:17

## 2022-12-31 NOTE — Clinical Note
Patient Class[de-identified] OBSERVATION [104]   Type of Bed: Medical [8]   Cardiac Monitoring Required?: No   Reason for Observation: hyration   Admitting Diagnosis: DEBORAH (acute kidney injury) [facility]) [[id number]]   Admitting Physician: [address] [place] [address]   Attending Physician: [address] [place] [address]

## 2023-01-01 PROBLEM — R53.81 DEBILITATED PATIENT: Status: ACTIVE | Noted: 2023-01-01

## 2023-01-01 PROBLEM — I82.409 ACUTE DEEP VENOUS THROMBOSIS (HCC): Status: RESOLVED | Noted: 2022-12-11 | Resolved: 2023-01-01

## 2023-01-01 PROBLEM — R06.00 DYSPNEA: Status: RESOLVED | Noted: 2022-12-10 | Resolved: 2023-01-01

## 2023-01-01 PROBLEM — R79.89 ELEVATED D-DIMER: Status: RESOLVED | Noted: 2022-12-11 | Resolved: 2023-01-01
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
## 2023-01-01 PROBLEM — D72.829 LEUKOCYTOSIS: Status: ACTIVE | Noted: 2023-01-01

## 2023-01-01 PROBLEM — E87.6 HYPOKALEMIA: Status: ACTIVE | Noted: 2023-01-01

## 2023-01-01 PROBLEM — Z86.718 HX OF DEEP VENOUS THROMBOSIS: Status: ACTIVE | Noted: 2023-01-01

## 2023-01-01 LAB
ANION GAP SERPL CALC-SCNC: 7 MMOL/L (ref 5–15)
APPEARANCE UR: CLEAR
BACTERIA URNS QL MICRO: NEGATIVE /HPF
BASOPHILS # BLD: 0 K/UL (ref 0–0.1)
BASOPHILS NFR BLD: 0 % (ref 0–1)
BILIRUB UR QL: NEGATIVE
BUN SERPL-MCNC: 72 MG/DL (ref 6–20)
BUN/CREAT SERPL: 34 (ref 12–20)
CALCIUM SERPL-MCNC: 8.1 MG/DL (ref 8.5–10.1)
CHLORIDE SERPL-SCNC: 98 MMOL/L (ref 97–108)
CO2 SERPL-SCNC: 31 MMOL/L (ref 21–32)
COLOR UR: ABNORMAL
CREAT SERPL-MCNC: 2.09 MG/DL (ref 0.55–1.02)
DIFFERENTIAL METHOD BLD: ABNORMAL
EOSINOPHIL # BLD: 0 K/UL (ref 0–0.4)
EOSINOPHIL NFR BLD: 0 % (ref 0–7)
EPITH CASTS URNS QL MICRO: ABNORMAL /LPF
ERYTHROCYTE [DISTWIDTH] IN BLOOD BY AUTOMATED COUNT: 14.3 % (ref 11.5–14.5)
GLUCOSE SERPL-MCNC: 103 MG/DL (ref 65–100)
GLUCOSE UR STRIP.AUTO-MCNC: NEGATIVE MG/DL
HCT VFR BLD AUTO: 29.3 % (ref 35–47)
HGB BLD-MCNC: 9.4 G/DL (ref 11.5–16)
HGB UR QL STRIP: NEGATIVE
HYALINE CASTS URNS QL MICRO: ABNORMAL /LPF (ref 0–2)
IMM GRANULOCYTES # BLD AUTO: 0.1 K/UL (ref 0–0.04)
IMM GRANULOCYTES NFR BLD AUTO: 1 % (ref 0–0.5)
KETONES UR QL STRIP.AUTO: NEGATIVE MG/DL
LEUKOCYTE ESTERASE UR QL STRIP.AUTO: NEGATIVE
LYMPHOCYTES # BLD: 1.1 K/UL (ref 0.8–3.5)
LYMPHOCYTES NFR BLD: 10 % (ref 12–49)
MCH RBC QN AUTO: 30.9 PG (ref 26–34)
MCHC RBC AUTO-ENTMCNC: 32.1 G/DL (ref 30–36.5)
MCV RBC AUTO: 96.4 FL (ref 80–99)
MONOCYTES # BLD: 0.9 K/UL (ref 0–1)
MONOCYTES NFR BLD: 8 % (ref 5–13)
NEUTS SEG # BLD: 9.4 K/UL (ref 1.8–8)
NEUTS SEG NFR BLD: 81 % (ref 32–75)
NITRITE UR QL STRIP.AUTO: NEGATIVE
NRBC # BLD: 0 K/UL (ref 0–0.01)
NRBC BLD-RTO: 0 PER 100 WBC
PH UR STRIP: 5 (ref 5–8)
PLATELET # BLD AUTO: 103 K/UL (ref 150–400)
PMV BLD AUTO: 12.6 FL (ref 8.9–12.9)
POTASSIUM SERPL-SCNC: 3.2 MMOL/L (ref 3.5–5.1)
PROT UR STRIP-MCNC: ABNORMAL MG/DL
RBC # BLD AUTO: 3.04 M/UL (ref 3.8–5.2)
RBC #/AREA URNS HPF: ABNORMAL /HPF (ref 0–5)
SODIUM SERPL-SCNC: 136 MMOL/L (ref 136–145)
SP GR UR REFRACTOMETRY: 1.01 (ref 1–1.03)
UR CULT HOLD, URHOLD: NORMAL
UROBILINOGEN UR QL STRIP.AUTO: 0.2 EU/DL (ref 0.2–1)
WBC # BLD AUTO: 11.6 K/UL (ref 3.6–11)
WBC URNS QL MICRO: ABNORMAL /HPF (ref 0–4)

## 2023-01-01 PROCEDURE — 81001 URINALYSIS AUTO W/SCOPE: CPT

## 2023-01-01 PROCEDURE — 85025 COMPLETE CBC W/AUTO DIFF WBC: CPT

## 2023-01-01 PROCEDURE — 74011250637 HC RX REV CODE- 250/637: Performed by: INTERNAL MEDICINE

## 2023-01-01 PROCEDURE — 96372 THER/PROPH/DIAG INJ SC/IM: CPT

## 2023-01-01 PROCEDURE — 74011250636 HC RX REV CODE- 250/636: Performed by: INTERNAL MEDICINE

## 2023-01-01 PROCEDURE — 96374 THER/PROPH/DIAG INJ IV PUSH: CPT

## 2023-01-01 PROCEDURE — 80048 BASIC METABOLIC PNL TOTAL CA: CPT

## 2023-01-01 PROCEDURE — 36415 COLL VENOUS BLD VENIPUNCTURE: CPT

## 2023-01-01 PROCEDURE — G0378 HOSPITAL OBSERVATION PER HR: HCPCS

## 2023-01-01 RX ORDER — CITALOPRAM 20 MG/1
20 TABLET, FILM COATED ORAL DAILY
Status: DISCONTINUED | OUTPATIENT
Start: 2023-01-01 | End: 2023-01-04

## 2023-01-01 RX ORDER — ONDANSETRON 2 MG/ML
4 INJECTION INTRAMUSCULAR; INTRAVENOUS
Status: DISCONTINUED | OUTPATIENT
Start: 2023-01-01 | End: 2023-01-06 | Stop reason: HOSPADM

## 2023-01-01 RX ORDER — POTASSIUM CHLORIDE, DEXTROSE MONOHYDRATE AND SODIUM CHLORIDE 300; 5; 900 MG/100ML; G/100ML; MG/100ML
INJECTION, SOLUTION INTRAVENOUS CONTINUOUS
Status: DISCONTINUED | OUTPATIENT
Start: 2023-01-01 | End: 2023-01-05

## 2023-01-01 RX ORDER — ENOXAPARIN SODIUM 100 MG/ML
30 INJECTION SUBCUTANEOUS DAILY
Status: DISCONTINUED | OUTPATIENT
Start: 2023-01-01 | End: 2023-01-06

## 2023-01-01 RX ORDER — DONEPEZIL HYDROCHLORIDE 5 MG/1
10 TABLET, FILM COATED ORAL
Status: DISCONTINUED | OUTPATIENT
Start: 2023-01-01 | End: 2023-01-06 | Stop reason: HOSPADM

## 2023-01-01 RX ORDER — LEVOTHYROXINE SODIUM 112 UG/1
112 TABLET ORAL
Status: DISCONTINUED | OUTPATIENT
Start: 2023-01-01 | End: 2023-01-06 | Stop reason: HOSPADM

## 2023-01-01 RX ORDER — POLYETHYLENE GLYCOL 3350 17 G/17G
17 POWDER, FOR SOLUTION ORAL DAILY PRN
Status: DISCONTINUED | OUTPATIENT
Start: 2023-01-01 | End: 2023-01-06 | Stop reason: HOSPADM

## 2023-01-01 RX ORDER — ACETAMINOPHEN 325 MG/1
650 TABLET ORAL
Status: DISCONTINUED | OUTPATIENT
Start: 2023-01-01 | End: 2023-01-06 | Stop reason: HOSPADM

## 2023-01-01 RX ORDER — ONDANSETRON 4 MG/1
4 TABLET, ORALLY DISINTEGRATING ORAL
Status: DISCONTINUED | OUTPATIENT
Start: 2023-01-01 | End: 2023-01-06 | Stop reason: HOSPADM

## 2023-01-01 RX ORDER — ACETAMINOPHEN 650 MG/1
650 SUPPOSITORY RECTAL
Status: DISCONTINUED | OUTPATIENT
Start: 2023-01-01 | End: 2023-01-06 | Stop reason: HOSPADM

## 2023-01-01 RX ADMIN — DEXTROSE MONOHYDRATE, SODIUM CHLORIDE, AND POTASSIUM CHLORIDE: 50; 9; 2.98 INJECTION, SOLUTION INTRAVENOUS at 11:19

## 2023-01-01 RX ADMIN — LEVOTHYROXINE SODIUM 112 MCG: 0.11 TABLET ORAL at 11:24

## 2023-01-01 RX ADMIN — CITALOPRAM HYDROBROMIDE 20 MG: 20 TABLET ORAL at 11:24

## 2023-01-01 RX ADMIN — ENOXAPARIN SODIUM 30 MG: 100 INJECTION SUBCUTANEOUS at 11:22

## 2023-01-01 NOTE — ED NOTES
Pt was going to go back to the  facility but once we got her up and  tried to walk, she was not going to be going  back to facility. Family  tells me that she is well sedated at the  facility and that family feels that may be part of why she fell this evening. Family has gone home now. [address] AND HIS NUMBER -[phone] AND  [phone].

## 2023-01-01 NOTE — DISCHARGE INSTRUCTIONS
Patient Discharge Instructions    Bhumika Schrader / [id number] : 1930    Admitted 2022 Discharged: 2023     Primary Diagnoses  @Rprob@    Take Home Medications     It is important that you take the medication exactly as they are prescribed. Keep your medication in the bottles provided by the pharmacist and keep a list of the medication names, dosages, and times to be taken in your wallet. Do not take other medications without consulting your doctor. What to do at Home    Recommended diet: Regular Diet    Recommended activity: Activity as tolerated and PT/OT Eval and Treat    If you experience worse symptoms, please follow up with your PCP. Follow-up with your PCP in a few weeks    [unfilled]     Information obtained by :  I understand that if any problems occur once I am at home I am to contact my physician. I understand and acknowledge receipt of the instructions indicated above.                                                                                                                                            Physician's or R.N.'s Signature                                                                  Date/Time                                                                                                                                              Patient or Representative Signature                                                          Date/Time

## 2023-01-01 NOTE — ED NOTES
Into  change her and  to  do a in/out cath to get urine. Sent to labs. Tolerated well.   Sent to  lab

## 2023-01-01 NOTE — ED TRIAGE NOTES
Pt was on  toilet and  bearing down to  move her bowels and fell forward striking the  floor. Unknown Has bruising to her jaw and  posible broke teeth and  EMS said a laceration tot eh top of her mouth.   Is at her normal LOC per facility EMS  tells me

## 2023-01-01 NOTE — ED NOTES
While cleaning pt up I found a large discoloration to the  right upper lateral thigh. Pt is not sure when or how she got this. She lives at a rehab facility getting rehab now.

## 2023-01-01 NOTE — ED NOTES
Got patient up to see that she is good to walk and she is leaning  forward and  wants to  fall forward. We tried several times to  get her to stand up  straight and  not lean but she continues to lean forward and  is very  top heavy. Had to have her son help me get her back into the  bed as well. MD aware of situation. Son  was going to  take her back to the  facility  but does not feel she is  able to return at this point.

## 2023-01-01 NOTE — H&P
[facility]  [address], [facility] [address]  [phone]    Hospitalist Admission Note      NAME:  Rj Valdez   :   1930   MRN:  [id number]     PCP:  Eb Nazario MD     Date/Time of service:  2023 7:13 AM          Subjective:     CHIEF COMPLAINT: fall     HISTORY OF PRESENT ILLNESS:     Ms. Andrew Walls is a 80 y.o.  female who presented to the Emergency Department complaining of fall. She provides no hx. Family not present. Occurred at a SNF. Son stated a concern that she is over sedated. ER finds dehydration. We will admit her for observation. Past Medical History:   Diagnosis Date    Anxiety and depression     Diverticular disease of colon     High cholesterol     HTN (hypertension)     Hypothyroid     IBS (irritable bowel syndrome)         Past Surgical History:   Procedure Laterality Date    ENDOSCOPY, COLON, DIAGNOSTIC  3/10    Tics        o/w  neg    HX CATARACT REMOVAL      HX CHOLECYSTECTOMY      HX GYN  1985    hysterectomy    HX KNEE REPLACEMENT  2012    Right    [facility] ERCP W/SPHINCTEROTOMY/PAPILLOTOMY         Social History     Tobacco Use    Smoking status: Never    Smokeless tobacco: Never   Substance Use Topics    Alcohol use: No        Family History   Problem Relation Age of Onset    Heart Disease Mother     Heart Disease Father     Heart Disease Brother     Heart Disease Brother       Family hx cannot be fully assessed, since the patient cannot provide information    Allergies   Allergen Reactions    Seroquel [Quetiapine] Other (comments)     agitation        Prior to Admission medications    Medication Sig Start Date End Date Taking? Authorizing Provider   donepeziL (ARICEPT) 10 mg tablet TAKE 1 TABLET EVERY NIGHT 22   Eb Nazario MD   levothyroxine (SYNTHROID) 112 mcg tablet TAKE 1 TABLET EVERY DAY BEFORE BREAKFAST  Patient taking differently: Take 100 mcg by mouth.  22   Christiano Black Stephen Sommers MD   citalopram (CELEXA) 20 mg tablet TAKE 1 AND 1/2 TABLETS EVERY DAY 4/12/22   Ino Collado MD       Review of Systems:  Indirect  (bold if positive, if negative)    Gen:  fatigue  Eyes:  ENT:  CVS:  Pulm:  GI:  :  MS:  Pain, weakness, swelling, arthritis  Skin:  Psych:  Endo:  Hem:  Renal:  Neuro:        Objective:      VITALS:    Vital signs reviewed; most recent are:    Visit Vitals  BP (!) 100/52 (BP 1 Location: Left lower arm, BP Patient Position: At rest)   Pulse (!) 55   Temp 98.4 °F (36.9 °C)   Resp 16   Ht 5' 5\" (1.651 m)   Wt 63.5 kg (140 lb)   SpO2 92%   BMI 23.30 kg/m²     SpO2 Readings from Last 6 Encounters:   01/01/23 92%   12/14/22 94%   08/23/12 100%   08/08/12 98%        No intake or output data in the 24 hours ending 01/01/23 0713     Exam:     Physical Exam:    Gen:  Frail, ill appearing, in no acute distress  HEENT:  Pink conjunctivae, PERRL, hearing barely intact to voice, dry mucous membranes  Neck:  Supple, without masses, thyroid non-tender  Resp:  No accessory muscle use, clear breath sounds without wheezes rales or rhonchi  Card:  No murmurs, normal S1, S2 without thrills, bruits or peripheral edema  Abd:  Soft, non-tender, non-distended, normoactive bowel sounds are present, no mass  Lymph:  No cervical or inguinal adenopathy  Musc:  No cyanosis or clubbing  Skin:  contusions, No rashes or ulcers, skin turgor is reduced  Neuro:  Cranial nerves are grossly intact, general motor weakness, follows commands poorly  Psych:  Poor insight, oriented to person,     Labs:    Recent Labs     01/01/23  0504   WBC 11.6*   HGB 9.4*   HCT 29.3*   *     Recent Labs     01/01/23  0504      K 3.2*   CL 98   CO2 31   *   BUN 72*   CREA 2.09*   CA 8.1*     No results found for: GLUCPOC  No results for input(s): PH, PCO2, PO2, HCO3, FIO2 in the last 72 hours. No results for input(s): INR, INREXT in the last 72 hours.   All Micro Results       Procedure Component Value Units Date/Time    URINE CULTURE HOLD SAMPLE [[id number]] Collected: 01/01/23 0504    Order Status: Completed Specimen: Urine Updated: 01/01/23 0548     Urine culture hold       Urine on hold in Microbiology dept for 2 days. If unpreserved urine is submitted, it cannot be used for addtional testing after 24 hours, recollection will be required. I have reviewed previous records       Assessment and Plan:      DEBORAH (acute kidney injury) / Hypokalemia / Dehydration - POA presume due to poor PO intake. Recurrent. Hydrate. May have CKD3. Debilitated patient / Falls - Worse since first admission weeks ago. We discharged her to [facility] 2 weeks ago. Son is concerned with her care there. CM consulted. She is ready to go to any facility. Acute deep venous thrombosis / Elevated d-dimer - POA, new 2 weeks ago. No hemodynamic compromise or hypoxia. US found one small DVT in L peroneal vein. VQ scan was intermediate probability. However she has iron deficient anemia and is hemoccult positive. I did not recommend aggressive treatment. He son concurred with plan and we did not start treatment. Anemia / Thrombocytopenia - Stable. Low iron on recent serologies. Give PO iron at home     HTN (hypertension) - BP remains low normal. No longer on lisinopril     Hypothyroid - TSH normal. Continue synthroid     Dementia / Anxiety and depression / IBS (irritable bowel syndrome) - POA, stable. Continue donepazil and citalopram.  I do not think a sitter has ever been warranted. Telemetry reviewed:   normal sinus rhythm    Risk of deterioration: high      Total time spent with patient: 48 Minutes I personally reviewed chart, notes, data and current medications in the medical record. I have personally examined and treated the patient at bedside during this period.  To assist coordination of care and communication with nursing and staff, this note may be preliminary early in the day, but finalized by end of the day.                  Care Plan discussed with: Patient, Family, Care Manager, Nursing Staff, and >50% of time spent in counseling and coordination of care    Discussed:  Care Plan and D/C Planning       ___________________________________________________    Attending Physician: Andrews Ellington MD

## 2023-01-01 NOTE — ED NOTES
Pt unable to get into personal vehicle with son, family asked to set up ambulance transportation for pt. While asking for address family member was upset at rehab situation and wanted to talk to case management    [facility] - rehab - bed [address] . [id number] [facility] [address]  Report number [phone]  [facility] ETA: 1000 - canceled @ 7246 pt being admitted.  [facility] dispatch stated that pt is on \"will call list\" is ative until pt is ready for DC

## 2023-01-02 LAB
ALBUMIN SERPL-MCNC: 3 G/DL (ref 3.5–5)
ALBUMIN/GLOB SERPL: 1 (ref 1.1–2.2)
ALP SERPL-CCNC: 50 U/L (ref 45–117)
ALT SERPL-CCNC: 31 U/L (ref 12–78)
ANION GAP SERPL CALC-SCNC: 7 MMOL/L (ref 5–15)
AST SERPL-CCNC: 30 U/L (ref 15–37)
BILIRUB SERPL-MCNC: 0.9 MG/DL (ref 0.2–1)
BUN SERPL-MCNC: 68 MG/DL (ref 6–20)
BUN/CREAT SERPL: 44 (ref 12–20)
CALCIUM SERPL-MCNC: 8 MG/DL (ref 8.5–10.1)
CHLORIDE SERPL-SCNC: 101 MMOL/L (ref 97–108)
CO2 SERPL-SCNC: 28 MMOL/L (ref 21–32)
CREAT SERPL-MCNC: 1.54 MG/DL (ref 0.55–1.02)
ERYTHROCYTE [DISTWIDTH] IN BLOOD BY AUTOMATED COUNT: 14.5 % (ref 11.5–14.5)
GLOBULIN SER CALC-MCNC: 2.9 G/DL (ref 2–4)
GLUCOSE SERPL-MCNC: 103 MG/DL (ref 65–100)
HCT VFR BLD AUTO: 30 % (ref 35–47)
HGB BLD-MCNC: 9.7 G/DL (ref 11.5–16)
MAGNESIUM SERPL-MCNC: 2.1 MG/DL (ref 1.6–2.4)
MCH RBC QN AUTO: 31.3 PG (ref 26–34)
MCHC RBC AUTO-ENTMCNC: 32.3 G/DL (ref 30–36.5)
MCV RBC AUTO: 96.8 FL (ref 80–99)
NRBC # BLD: 0 K/UL (ref 0–0.01)
NRBC BLD-RTO: 0 PER 100 WBC
PLATELET # BLD AUTO: 121 K/UL (ref 150–400)
PMV BLD AUTO: 12.7 FL (ref 8.9–12.9)
POTASSIUM SERPL-SCNC: 3.5 MMOL/L (ref 3.5–5.1)
PROT SERPL-MCNC: 5.9 G/DL (ref 6.4–8.2)
RBC # BLD AUTO: 3.1 M/UL (ref 3.8–5.2)
SODIUM SERPL-SCNC: 136 MMOL/L (ref 136–145)
WBC # BLD AUTO: 11.8 K/UL (ref 3.6–11)

## 2023-01-02 PROCEDURE — 85027 COMPLETE CBC AUTOMATED: CPT

## 2023-01-02 PROCEDURE — 96372 THER/PROPH/DIAG INJ SC/IM: CPT

## 2023-01-02 PROCEDURE — 97165 OT EVAL LOW COMPLEX 30 MIN: CPT | Performed by: OCCUPATIONAL THERAPIST

## 2023-01-02 PROCEDURE — 74011250637 HC RX REV CODE- 250/637: Performed by: INTERNAL MEDICINE

## 2023-01-02 PROCEDURE — 77030038269 HC DRN EXT URIN PURWCK BARD -A

## 2023-01-02 PROCEDURE — 97530 THERAPEUTIC ACTIVITIES: CPT | Performed by: OCCUPATIONAL THERAPIST

## 2023-01-02 PROCEDURE — 65270000029 HC RM PRIVATE

## 2023-01-02 PROCEDURE — 83735 ASSAY OF MAGNESIUM: CPT

## 2023-01-02 PROCEDURE — 96374 THER/PROPH/DIAG INJ IV PUSH: CPT

## 2023-01-02 PROCEDURE — 80053 COMPREHEN METABOLIC PANEL: CPT

## 2023-01-02 PROCEDURE — G0378 HOSPITAL OBSERVATION PER HR: HCPCS

## 2023-01-02 PROCEDURE — 36415 COLL VENOUS BLD VENIPUNCTURE: CPT

## 2023-01-02 PROCEDURE — 74011250636 HC RX REV CODE- 250/636: Performed by: INTERNAL MEDICINE

## 2023-01-02 RX ADMIN — CITALOPRAM HYDROBROMIDE 20 MG: 20 TABLET ORAL at 10:52

## 2023-01-02 RX ADMIN — ENOXAPARIN SODIUM 30 MG: 100 INJECTION SUBCUTANEOUS at 10:52

## 2023-01-02 RX ADMIN — DEXTROSE MONOHYDRATE, SODIUM CHLORIDE, AND POTASSIUM CHLORIDE: 50; 9; 2.98 INJECTION, SOLUTION INTRAVENOUS at 05:49

## 2023-01-02 RX ADMIN — DEXTROSE MONOHYDRATE, SODIUM CHLORIDE, AND POTASSIUM CHLORIDE: 50; 9; 2.98 INJECTION, SOLUTION INTRAVENOUS at 14:14

## 2023-01-02 RX ADMIN — LEVOTHYROXINE SODIUM 112 MCG: 0.11 TABLET ORAL at 10:52

## 2023-01-02 RX ADMIN — DEXTROSE MONOHYDRATE, SODIUM CHLORIDE, AND POTASSIUM CHLORIDE: 50; 9; 2.98 INJECTION, SOLUTION INTRAVENOUS at 23:33

## 2023-01-02 RX ADMIN — DONEPEZIL HYDROCHLORIDE 10 MG: 5 TABLET, FILM COATED ORAL at 00:17

## 2023-01-02 NOTE — ED NOTES
RN noted swelling in LUE at IV site. Fluids discontinued from that IV site and IV access pulled due to infiltration.

## 2023-01-02 NOTE — ED NOTES
Pt repositioned in bed, erica care provided.  Pt lying on left side with pillows to back and between knees

## 2023-01-02 NOTE — ED NOTES
TRANSFER - OUT REPORT:    Verbal report given to Estefany Castillo on Chrisandra Hearing  being transferred to [address] [place] [address] for routine progression of care       Report consisted of patients Situation, Background, Assessment and   Recommendations(SBAR). Information from the following report(s) SBAR, Kardex, ED Summary, Intake/Output, MAR, Recent Results, and Med Rec Status was reviewed with the receiving nurse. Lines:   Peripheral IV 01/01/23 Right Antecubital (Active)        Opportunity for questions and clarification was provided.       Patient transported with:   [employer]

## 2023-01-02 NOTE — ED NOTES
RN noted swelling in LUE at [facility] site that was placed by another nurse by [address]. Fluids discontinued from that IV site and moved to a different site. IV access pulled due to infiltration.

## 2023-01-02 NOTE — ED NOTES
Bedside and Verbal shift change report given to Glenda Hernández RN (oncoming nurse) by Barbara Holcomb RN (offgoing nurse). Report included the following information SBAR, Kardex, Intake/Output, MAR and Recent Results.

## 2023-01-02 NOTE — ED NOTES
1030: pt's family at bedside. Pt opened eyes and is speaking with her family    80: OT at bedside to evaluate pt. Please see OT note. Pt able to swallow pills, NAD noted. Pt was assisted to position of comfort with rolled blanket under her right leg for comfort.

## 2023-01-02 NOTE — ED NOTES
Assumed care of patient from [address] [place] [address] using sbar.  pt is sleeping at this time

## 2023-01-02 NOTE — PROGRESS NOTES
[facility]  [address], [facility]  [phone]    Hospitalist Progress Note      NAME: Jordan Hurley   :  1930  MRM:  [id number]    Date/Time of service 2023  12:21 PM         Assessment and Plan:     DEBORAH (acute kidney injury) / Hypokalemia / Dehydration - POA presume due to poor PO intake. Recurrent. Hydrated and improved. May have CKD3. Debilitated patient / Falls - Worse since first admission weeks ago. We discharged her to [facility] 2 weeks ago. Son is concerned with her care there. CM consulted. She is ready to go now to any other facility. Acute deep venous thrombosis / Elevated d-dimer - POA, new 2 weeks ago. No hemodynamic compromise or hypoxia. US found one small DVT in L peroneal vein. VQ scan was intermediate probability. However she has iron deficient anemia and is hemoccult positive. I did not recommend aggressive treatment. He son concurred with plan and we did not start treatment. Anemia / Thrombocytopenia - Stable. Low iron on recent serologies. Give PO iron at home     HTN (hypertension) - BP remains low normal. No longer on lisinopril     Hypothyroid - TSH normal. Continue synthroid     Dementia / Anxiety and depression / IBS (irritable bowel syndrome) - POA, stable. Continue donepazil and citalopram.  I do not think a sitter has ever been warranted. COVID infection - Per family Dx 15 days ago at [facility]. No symptoms. Subjective:     Chief Complaint:  weak    ROS:  (bold if positive, if negative)    Tolerating some PT  Tolerating some Diet        Objective:     Last 24hrs VS reviewed since prior progress note.  Most recent are:    Visit Vitals  BP (!) 113/57 (BP 1 Location: Left lower arm, BP Patient Position: At rest)   Pulse 61   Temp 97.4 °F (36.3 °C)   Resp 20   Ht 5' 5\" (1.651 m)   Wt 63.5 kg (140 lb)   SpO2 95%   BMI 23.30 kg/m²     SpO2 Readings from Last 6 Encounters:   23 95%   22 94% 08/23/12 100%   08/08/12 98%        No intake or output data in the 24 hours ending 01/02/23 0836     Physical Exam:    Gen:  Well-developed, well-nourished, in no acute distress  HEENT:  Pink conjunctivae, PERRL, hearing intact to voice, moist mucous membranes  Neck:  Supple, without masses, thyroid non-tender  Resp:  No accessory muscle use, clear breath sounds without wheezes rales or rhonchi  Card:  No murmurs, normal S1, S2 without thrills, bruits or peripheral edema  Abd:  Soft, non-tender, non-distended, normoactive bowel sounds are present, no mass  Lymph:  No cervical or inguinal adenopathy  Musc:  No cyanosis or clubbing  Skin:  laceration, bruising, skin turgor is good  Neuro:  Cranial nerves are grossly intact, general motor weakness, follows commands   Psych:  Poor insight, oriented to person, place     Telemetry reviewed:   normal sinus rhythm  __________________________________________________________________  Medications Reviewed: (see below)  Medications:     Current Facility-Administered Medications   Medication Dose Route Frequency    acetaminophen (TYLENOL) tablet 650 mg  650 mg Oral Q6H PRN    Or    acetaminophen (TYLENOL) suppository 650 mg  650 mg Rectal Q6H PRN    polyethylene glycol (MIRALAX) packet 17 g  17 g Oral DAILY PRN    ondansetron (ZOFRAN ODT) tablet 4 mg  4 mg Oral Q8H PRN    Or    ondansetron (ZOFRAN) injection 4 mg  4 mg IntraVENous Q6H PRN    dextrose 5% - 0.9% NaCl with KCl 40 mEq/L infusion   IntraVENous CONTINUOUS    enoxaparin (LOVENOX) injection 30 mg  30 mg SubCUTAneous DAILY    citalopram (CELEXA) tablet 20 mg  20 mg Oral DAILY    donepeziL (ARICEPT) tablet 10 mg  10 mg Oral QHS    levothyroxine (SYNTHROID) tablet 112 mcg  112 mcg Oral ACB     Current Outpatient Medications   Medication Sig    donepeziL (ARICEPT) 10 mg tablet TAKE 1 TABLET EVERY NIGHT    levothyroxine (SYNTHROID) 112 mcg tablet TAKE 1 TABLET EVERY DAY BEFORE BREAKFAST (Patient taking differently: Take 100 mcg by mouth.)    citalopram (CELEXA) 20 mg tablet TAKE 1 AND 1/2 TABLETS EVERY DAY        Lab Data Reviewed: (see below)  Lab Review:     Recent Labs     01/02/23 0034 01/01/23 0504   WBC 11.8* 11.6*   HGB 9.7* 9.4*   HCT 30.0* 29.3*   * 103*     Recent Labs     01/02/23 0034 01/01/23 0504    136   K 3.5 3.2*    98   CO2 28 31   * 103*   BUN 68* 72*   CREA 1.54* 2.09*   CA 8.0* 8.1*   MG 2.1  --    ALB 3.0*  --    TBILI 0.9  --    ALT 31  --      No results found for: GLUCPOC  No results for input(s): PH, PCO2, PO2, HCO3, FIO2 in the last 72 hours. No results for input(s): INR, INREXT in the last 72 hours. All Micro Results       Procedure Component Value Units Date/Time    URINE CULTURE HOLD SAMPLE [[id number]] Collected: 01/01/23 0504    Order Status: Completed Specimen: Urine Updated: 01/01/23 0548     Urine culture hold       Urine on hold in Microbiology dept for 2 days. If unpreserved urine is submitted, it cannot be used for addtional testing after 24 hours, recollection will be required. Other pertinent lab: none    Total time spent with patient: 30 Minutes I personally reviewed chart, notes, data and current medications in the medical record. I have personally examined and treated the patient at bedside during this period. To assist coordination of care and communication with nursing and staff, this note may be preliminary early in the day, but finalized by end of the day.                  Care Plan discussed with: Patient, Care Manager, Nursing Staff, and >50% of time spent in counseling and coordination of care    Discussed:  Care Plan and D/C Planning    Prophylaxis:  Lovenox and H2B/PPI    Disposition:  SNF/LTC           ___________________________________________________    Attending Physician: Liv Joiner MD

## 2023-01-02 NOTE — ED NOTES
RN noticed that D Dimer from 0715 had not been drawn yet so attempted to draw from all of pt's IV sites. None of the IV sites gave blood return. Provider notified and inquired if he still wanted the D Dimer drawn due to the circumstances. Provider responded that he does want it still but that there is no rush on it.  RN asked US IV tech to place an endurance catheter this afternoon

## 2023-01-02 NOTE — ED NOTES
Bedside and Verbal shift change report given to KAELYN Welch (oncoming nurse) by Shaggy Srinivasan RN (offgoing nurse). Report included the following information SBAR, Kardex, Intake/Output, MAR and Recent Results.

## 2023-01-02 NOTE — PROGRESS NOTES
Problem: Self Care Deficits Care Plan (Adult)  Goal: *Acute Goals and Plan of Care (Insert Text)  Description: FUNCTIONAL STATUS PRIOR TO ADMISSION: Patient was independent and active without use of DME. Per family prior to hospitalization and admission to [facility] she was living alone, cooking for herself, Independent basic ADL's. Per her son she ambulated ~100' with rolling walker at rehab. Per his report she had 3 falls at rehab and COVID while at [facility]: The patient lived alone with her son to provide assistance. Patient's son lives ~50 miles away    Occupational Therapy Goals  Initiated 1/2/2023  1. Patient will perform static standing with bilateral UE support on rolling walker > or = 5 minutes with minimal assistance/contact guard assist within 7 day(s). 2.  Patient will perform upper body dressing with supervision/set-up within 7 day(s). 3.  Patient will perform lower body dressing with moderate assistance and adaptive equipment prn within 7 day(s). 4.  Patient will perform toilet transfers with minimal assistance/contact guard assist using rolling walker within 7 day(s). 5.  Patient will perform all aspects of toileting with moderate assistance  within 7 day(s). Outcome: Not Met    OCCUPATIONAL THERAPY EVALUATION  Patient: Kamla Sanderson (80 y.o. female)  Date: 1/2/2023  Primary Diagnosis: DEBORAH (acute kidney injury) ([facility] [address] [N17.9]       Precautions:   Fall, Skin    ASSESSMENT  Based on the objective data described below, the patient presents with decreased activity tolerance and noted pain to touch lateral aspect of right hip and thigh, did not have pain response to sitting or standing however, noted wheezing and increased shortness of breath. Patient's oxygen stable on room air and is a mouth breather with max cues to attempt to encourage pursed lip breathing.  Supportive son present throughout and expressed frustration with falls at rehab as he was told she needed constant supervision, however then was left unsupervised. Patient primary complaint of being cold and provided additional gown and blankets. Feel if she makes progress in this setting she may benefit from home with family assist versus back to rehab. Current Level of Function Impacting Discharge (ADLs/self-care): total care LE ADL's, toileting, max assist bed mobility, mod assist of 1 sit to stand    Functional Outcome Measure: The patient scored 15/100 on the Barthel Index outcome measure which is indicative of total dependence. Other factors to consider for discharge: patient was nearing ready for discharge from [facility] however then had fall     Patient will benefit from skilled therapy intervention to address the above noted impairments. PLAN :  Recommendations and Planned Interventions: self care training, functional mobility training, therapeutic exercise, balance training, therapeutic activities, cognitive retraining, endurance activities, patient education, home safety training, and family training/education    Frequency/Duration: Patient will be followed by occupational therapy 5 times a week to address goals. Recommendation for discharge: (in order for the patient to meet his/her long term goals)  Occupational therapy at least 2 days/week in the home AND ensure assist and/or supervision for safety with basic ADL's and ADL transfers    This discharge recommendation:  Has not yet been discussed the attending provider and/or case management    IF patient discharges home will need the following DME: to be determined       SUBJECTIVE:   Patient stated I need to lay down.  perseverated on laying down after seated edge of bed    OBJECTIVE DATA SUMMARY:   HISTORY:   Past Medical History:   Diagnosis Date    Anxiety and depression     Diverticular disease of colon     High cholesterol     HTN (hypertension)     Hypothyroid     IBS (irritable bowel syndrome)      Past Surgical History: Procedure Laterality Date    ENDOSCOPY, COLON, DIAGNOSTIC  3/10    Tics        o/w  neg    HX CATARACT REMOVAL      HX CHOLECYSTECTOMY      HX GYN  1985    hysterectomy    HX KNEE REPLACEMENT  8/2012    Right    MS ERCP W/SPHINCTEROTOMY/PAPILLOTOMY  6/01       Expanded or extensive additional review of patient history:     Home Situation  Home Environment: Private residence  # Steps to Enter: 2  Rails to Enter: Yes  One/Two Story Residence: One story  Living Alone: Yes  Support Systems: Child(edilia)    Hand dominance: Right    EXAMINATION OF PERFORMANCE DEFICITS:  Cognitive/Behavioral Status:  Neurologic State: Eyes open to voice; Confused  Orientation Level: Disoriented to situation;Oriented to person;Disoriented to time;Disoriented to place  Cognition: Decreased command following;Decreased attention/concentration;Memory loss  Perception: Appears intact  Perseveration: Perseverates during conversation  Safety/Judgement: Decreased awareness of environment;Decreased awareness of need for safety    Skin: large bruise right hip and lateral aspect of thigh, dark purple    Edema: bilateral LE's    Hearing:    WFL    Vision/Perceptual:            Not assessed  Range of Motion:  AROM: Generally decreased, functional     Strength:  Strength: Generally decreased, functional        Coordination:          Gross Motor Skills-Upper: Left Intact; Right Intact    Tone & Sensation:  Tone: Normal        Balance:   Sitting: fair  Standing: static fair with support    Functional Mobility and Transfers for ADLs:  Bed Mobility:  Rolling: Maximum assistance  Supine to Sit: Maximum assistance;Assist x1;Additional time  Sit to Supine: Maximum assistance;Assist x1;Additional time  Scooting: Maximum assistance;Assist x2    Transfers:  Sit to Stand: Moderate assistance;Assist x1;Adaptive equipment  Stand to Sit: Moderate assistance;Minimum assistance;Assist x1  Bed to Chair: Minimum assistance; Moderate assistance; Additional time;Assist x1 (infer based on side step with walker)  Bathroom Mobility: Dependent/total assistance  Toilet Transfer : Total assistance (not tested due to decreased activity tolerance)    ADL Assessment:       Oral Facial Hygiene/Grooming: Total assistance    Bathing: Total assistance         Upper Body Dressing: Total assistance    Lower Body Dressing: Total assistance    Toileting: Total assistance                  ADL Intervention and task modifications:     Educated patient and her son and daughter-in-law on role of OT, benefit of increased activity and positioning in supine    Demonstrated decreased activity tolerance seated edge of bed and standing. Blood pressure low throughout nad 87/66 seated, 83/66 after standing and sidestep to head of bed          Cognitive Retraining  Safety/Judgement: Decreased awareness of environment;Decreased awareness of need for safety         Functional Measure:    Barthel Index:  Bathin  Bladder: 0  Bowels: 5  Groomin  Dressin  Feedin  Mobility: 0  Stairs: 0  Toilet Use: 0  Transfer (Bed to Chair and Back): 5  Total: 15/100      The Barthel ADL Index: Guidelines  1. The index should be used as a record of what a patient does, not as a record of what a patient could do. 2. The main aim is to establish degree of independence from any help, physical or verbal, however minor and for whatever reason. 3. The need for supervision renders the patient not independent. 4. A patient's performance should be established using the best available evidence. Asking the patient, friends/relatives and nurses are the usual sources, but direct observation and common sense are also important. However direct testing is not needed. 5. Usually the patient's performance over the preceding 24-48 hours is important, but occasionally longer periods will be relevant. 6. Middle categories imply that the patient supplies over 50 per cent of the effort.   7. Use of aids to be independent is allowed. Score Interpretation (from [facility])    Independent   60-79 Minimally independent   40-59 Partially dependent   20-39 Very dependent   <20 Totally dependent     -Chemo Huggins., Barthel, D.W. (1965). Functional evaluation: the Barthel Index. [address] ([address] (1997). The Barthel activities of daily living index: self-reporting versus actual performance in the old (> or = 75 years). Journal of [address] [place] [address] 45(7), [facility], J..M.F, [facility]. (1999). Measuring the change in disability after inpatient rehabilitation; comparison of the responsiveness of the Barthel Index and Functional [place] Measure. Journal of Neurology, Neurosurgery, and Psychiatry, 66(4), 354-463. ROZ Guthrie, MYNOR Lopez, & Em Melendez, MBulmaroA. (2004) Assessment of post-stroke quality of life in cost-effectiveness studies: The usefulness of the Barthel Index and the EuroQoL-5D. Quality of Life Research, 15, 269-15         Occupational Therapy Evaluation Charge Determination   History Examination Decision-Making   MEDIUM Complexity : Expanded review of history including physical, cognitive and psychosocial  history  MEDIUM Complexity : 3-5 performance deficits relating to physical, cognitive , or psychosocial skils that result in activity limitations and / or participation restrictions MEDIUM Complexity : Patient may present with comorbidities that affect occupational performnce.  Miniml to moderate modification of tasks or assistance (eg, physical or verbal ) with assesment(s) is necessary to enable patient to complete evaluation       Based on the above components, the patient evaluation is determined to be of the following complexity level: MEDIUM  Pain Rating:  Not rated, noted pain to touch/pressure right lateral aspect of thigh, large bruise noted, no sign of pain with sitting or standing    Activity Tolerance: Poor, requires frequent rest breaks, and observed SOB with activity    After treatment patient left in no apparent distress:    Supine in bed, Heels elevated for pressure relief, Call bell within reach, Caregiver / family present, and side rails of stretcher elevated    COMMUNICATION/EDUCATION:   The patients plan of care was discussed with: Physical therapist and Registered nurse. Home safety education was provided and the patient/caregiver indicated understanding. and Patient/family agree to work toward stated goals and plan of care. This patients plan of care is appropriate for delegation to [facility].     Thank you for this referral.  Saw Yates, OTR/L  Time Calculation: 29 mins

## 2023-01-02 NOTE — ED NOTES
Bedside shift change report given to Mile (oncoming nurse) by Candace Cruz (offgoing nurse).  Report included the following information SBAR, Kardex, ED Summary, Intake/Output, MAR, and Recent Results

## 2023-01-03 PROCEDURE — 74011250636 HC RX REV CODE- 250/636: Performed by: INTERNAL MEDICINE

## 2023-01-03 PROCEDURE — 74011250637 HC RX REV CODE- 250/637: Performed by: INTERNAL MEDICINE

## 2023-01-03 PROCEDURE — 97530 THERAPEUTIC ACTIVITIES: CPT

## 2023-01-03 PROCEDURE — 2709999900 HC NON-CHARGEABLE SUPPLY

## 2023-01-03 PROCEDURE — 97161 PT EVAL LOW COMPLEX 20 MIN: CPT

## 2023-01-03 PROCEDURE — 65270000029 HC RM PRIVATE

## 2023-01-03 RX ADMIN — DONEPEZIL HYDROCHLORIDE 10 MG: 5 TABLET, FILM COATED ORAL at 22:00

## 2023-01-03 RX ADMIN — SODIUM CHLORIDE, POTASSIUM CHLORIDE, SODIUM LACTATE AND CALCIUM CHLORIDE 1000 ML: 600; 310; 30; 20 INJECTION, SOLUTION INTRAVENOUS at 21:41

## 2023-01-03 RX ADMIN — ENOXAPARIN SODIUM 30 MG: 100 INJECTION SUBCUTANEOUS at 08:16

## 2023-01-03 RX ADMIN — LEVOTHYROXINE SODIUM 112 MCG: 0.11 TABLET ORAL at 08:06

## 2023-01-03 RX ADMIN — CITALOPRAM HYDROBROMIDE 20 MG: 20 TABLET ORAL at 08:16

## 2023-01-03 RX ADMIN — ACETAMINOPHEN 650 MG: 325 TABLET ORAL at 10:03

## 2023-01-03 NOTE — PROGRESS NOTES
Problem: Falls - Risk of  Goal: *Absence of Falls  Description: Document Jonh Rowleythal Fall Risk and appropriate interventions in the flowsheet. Outcome: Progressing Towards Goal  Note: Fall Risk Interventions:                                Problem: Patient Education: Go to Patient Education Activity  Goal: Patient/Family Education  Outcome: Progressing Towards Goal     Problem: Pressure Injury - Risk of  Goal: *Prevention of pressure injury  Description: Document Roberto Scale and appropriate interventions in the flowsheet.   Outcome: Progressing Towards Goal  Note: Pressure Injury Interventions:  Sensory Interventions: Keep linens dry and wrinkle-free, Float heels, Minimize linen layers    Moisture Interventions: Absorbent underpads, Check for incontinence Q2 hours and as needed, Minimize layers    Activity Interventions: PT/OT evaluation    Mobility Interventions: PT/OT evaluation    Nutrition Interventions: Document food/fluid/supplement intake, Offer support with meals,snacks and hydration    Friction and Shear Interventions: HOB 30 degrees or less, Minimize layers                Problem: Patient Education: Go to Patient Education Activity  Goal: Patient/Family Education  Outcome: Progressing Towards Goal

## 2023-01-03 NOTE — PROGRESS NOTES
1/3/2023 4:43 PM   Pt has been denied by [facility] and [facility] due to bed availability. CM spoke with pt's daughter in law, Xin Bustamante over the phone and pt's son, Dereck Phan in person. CM provided SNF listing for family to review for back up SNF preferences. Pt's son did list [facility] as a preference. CM relayed some [employer] policies are accepted and CM can send a referral to check if this will be an option. CM sent referral to [facility] via [employer]. CM will follow up with family on 1/4 for additional preferences. 1/3/2023 11:53 AM   Noted pt to need SNF placement. Met with pt and pt's son, Dereck Phan at bedside. CM completed assessment with Dereck Phan. Reason for Readmission:       1. Closed head injury, initial encounter    2. Multiple contusions    3. Weakness    4. DEBORAH (acute kidney injury) ([facility] [address]      Pt was recently admitted to [facility] from 12/20-12/14 for DEBORAH         RUR Score/Risk Level:   18%     PCP: First and Last name:  Norma Pitts MD     Name of Practice:   Are you a current patient: Yes/No:    Approximate date of last visit: Pt's next appt is in March 2023   Can you participate in a virtual visit with your PCP:     Is a Care Conference indicated:  Not at this time       Did you attend your follow up appointment (s): If not, why not:  Pt was at [facility]. Resources/supports as identified by patient/family:     Pt's son and daughter in law       Top Challenges facing patient (as identified by patient/family and CM): Finances/Medication cost?   No concerns, has rx coverage    Transportation     No concerns,pt will need wheelchair vs stretcher transport at discharge  Support system or lack thereof? No concerns, supportive family    Living arrangements? No concerns,  lived alone prior to last admission     Self-care/ADLs/Cognition? No concerns, needing assistance with adls            Current Advanced Directive/Advance Care Plan:   On File           Plan for utilizing home health:   N/a at this time              Transition of Care Plan:    Based on readmission, the patient's previous Plan of Care   has been evaluated and/or modified. The current Transition of Care Plan is:    SNF placement. CM called and spoke with pt's daughter in law, Fiordaliza Ferraro regarding preference for SNF. Pt's daughter in law was agreeable to referrals being sent to [facility] and [facility]. CM sent referrals via [employer]. Pt will need auth for placement. CM will follow. MORALES Weathers     Care Management Interventions  PCP Verified by CM:  Yes  Transition of Care Consult (CM Consult): Discharge Planning  Discharge Durable Medical Equipment: No  Physical Therapy Consult: Yes  Occupational Therapy Consult: Yes  Support Systems: Child(edilia)  Confirm Follow Up Transport: Family  The Plan for Transition of Care is Related to the Following Treatment Goals : SNF placement  The Patient and/or Patient Representative was Provided with a Choice of Provider and Agrees with the Discharge Plan?: Yes  Freedom of Choice List was Provided with Basic Dialogue that Supports the Patient's Individualized Plan of Care/Goals, Treatment Preferences and Shares the Quality Data Associated with the Providers?: Yes  Discharge Location  Patient Expects to be Discharged to[de-identified] Skilled nursing facility  Readmission Assessment  Number of days since last admission?: 8-30 days  Previous disposition: SNF  Who is being interviewed?: Caregiver  What was the patient's/caregiver's perception as to why they think they needed to return back to the hospital?: Other (Comment) (Poor care at SNF, fell at [facility])  Did you visit your Primary Care Physician after you left the hospital, before you returned this time?: No  Why weren't you able to visit your PCP?: Other (Comment) (Was still admitted to SNF)  Did you see a specialist, such as Cardiac, Pulmonary, Orthopedic Physician, etc. after you left the hospital?: No  Who advised the patient to return to the hospital?: Skilled Unit  Does the patient report anything that got in the way of taking their medications?: No  In our efforts to provide the best possible care to you and others like you, can you think of anything that we could have done to help you after you left the hospital the first time, so that you might not have needed to return so soon?: Other (Comment) (N/a)

## 2023-01-03 NOTE — PROGRESS NOTES
[employer]  [address] [place], [facility] [address]  [phone]    Hospitalist Progress Note      NAME: Rosie Suazo   :  1930  MRM:  [id number]    Date/Time of service 1/3/2023  9:40 AM        Assessment and Plan:     DEBORAH (acute kidney injury) / Hypokalemia / Dehydration - POA presume due to poor PO intake. Recurrent. Hydrated and improved. May have CKD3. Debilitated patient / Falls - Worse since first admission weeks ago. We discharged her to [facility] 2 weeks ago. Son is concerned with her care there. CM consulted. She is ready to go now to any other SNF. Acute deep venous thrombosis / Elevated d-dimer - POA, new 2 weeks ago. No hemodynamic compromise or hypoxia. US found one small DVT in L peroneal vein. VQ scan was intermediate probability. However she has iron deficient anemia and is hemoccult positive. I did not recommend aggressive treatment. He son concurred with plan and we did not start treatment. Anemia / Thrombocytopenia - Stable. Low iron on recent serologies. Give PO iron at home     HTN (hypertension) - BP remains low normal. No longer on lisinopril     Hypothyroid - TSH normal. Continue synthroid     Dementia / Anxiety and depression / IBS (irritable bowel syndrome) - POA, stable. Continue donepazil and citalopram.  I do not think a sitter has ever been warranted. COVID infection - Per family Dx 15 days ago at [facility]. No symptoms. Subjective:     Chief Complaint:  weak    ROS:  (bold if positive, if negative)    Tolerating some PT  Tolerating some Diet        Objective:     Last 24hrs VS reviewed since prior progress note.  Most recent are:    Visit Vitals  /62 (BP 1 Location: Right upper arm, BP Patient Position: At rest;Semi fowlers)   Pulse (!) 57   Temp 97.5 °F (36.4 °C)   Resp 19   Ht 5' 5\" (1.651 m)   Wt 63.5 kg (140 lb)   SpO2 96%   BMI 23.30 kg/m²     SpO2 Readings from Last 6 Encounters:   23 96% 12/14/22 94%   08/23/12 100%   08/08/12 98%          Intake/Output Summary (Last 24 hours) at 1/3/2023 0940  Last data filed at 1/3/2023 0700  Gross per 24 hour   Intake --   Output 300 ml   Net -300 ml          Physical Exam:    Gen:  Well-developed, well-nourished, in no acute distress  HEENT:  Pink conjunctivae, PERRL, hearing intact to voice, moist mucous membranes  Neck:  Supple, without masses, thyroid non-tender  Resp:  No accessory muscle use, clear breath sounds without wheezes rales or rhonchi  Card:  No murmurs, normal S1, S2 without thrills, bruits or peripheral edema  Abd:  Soft, non-tender, non-distended, normoactive bowel sounds are present, no mass  Lymph:  No cervical or inguinal adenopathy  Musc:  No cyanosis or clubbing  Skin:  laceration, bruising, skin turgor is good  Neuro:  Cranial nerves are grossly intact, general motor weakness, follows commands   Psych:  Poor insight, oriented to person, place     Telemetry reviewed:   normal sinus rhythm  __________________________________________________________________  Medications Reviewed: (see below)  Medications:     Current Facility-Administered Medications   Medication Dose Route Frequency    acetaminophen (TYLENOL) tablet 650 mg  650 mg Oral Q6H PRN    Or    acetaminophen (TYLENOL) suppository 650 mg  650 mg Rectal Q6H PRN    polyethylene glycol (MIRALAX) packet 17 g  17 g Oral DAILY PRN    ondansetron (ZOFRAN ODT) tablet 4 mg  4 mg Oral Q8H PRN    Or    ondansetron (ZOFRAN) injection 4 mg  4 mg IntraVENous Q6H PRN    dextrose 5% - 0.9% NaCl with KCl 40 mEq/L infusion   IntraVENous CONTINUOUS    enoxaparin (LOVENOX) injection 30 mg  30 mg SubCUTAneous DAILY    citalopram (CELEXA) tablet 20 mg  20 mg Oral DAILY    donepeziL (ARICEPT) tablet 10 mg  10 mg Oral QHS    levothyroxine (SYNTHROID) tablet 112 mcg  112 mcg Oral ACB        Lab Data Reviewed: (see below)  Lab Review:     Recent Labs     01/02/23  0034 01/01/23  0504   WBC 11.8* 11.6*   HGB 9. 7* 9.4*   HCT 30.0* 29.3*   * 103*       Recent Labs     01/02/23  0034 01/01/23  0504    136   K 3.5 3.2*    98   CO2 28 31   * 103*   BUN 68* 72*   CREA 1.54* 2.09*   CA 8.0* 8.1*   MG 2.1  --    ALB 3.0*  --    TBILI 0.9  --    ALT 31  --        No results found for: GLUCPOC  No results for input(s): PH, PCO2, PO2, HCO3, FIO2 in the last 72 hours. No results for input(s): INR, INREXT, INREXT in the last 72 hours. All Micro Results       Procedure Component Value Units Date/Time    URINE CULTURE HOLD SAMPLE [[id number]] Collected: 01/01/23 0504    Order Status: Completed Specimen: Urine Updated: 01/01/23 0548     Urine culture hold       Urine on hold in Microbiology dept for 2 days. If unpreserved urine is submitted, it cannot be used for addtional testing after 24 hours, recollection will be required. Other pertinent lab: none    Total time spent with patient: 30 Minutes I personally reviewed chart, notes, data and current medications in the medical record. I have personally examined and treated the patient at bedside during this period. To assist coordination of care and communication with nursing and staff, this note may be preliminary early in the day, but finalized by end of the day.                  Care Plan discussed with: Patient, Care Manager, Nursing Staff, and >50% of time spent in counseling and coordination of care    Discussed:  Care Plan and D/C Planning    Prophylaxis:  Lovenox and H2B/PPI    Disposition:  SNF/LTC           ___________________________________________________    Attending Physician: Sorin Mccauley MD

## 2023-01-03 NOTE — PROGRESS NOTES
7:51 AM  1/3/2023    Case management note    Received referral for snf placement. Spoke with family and they did not want patient to return to [facility]. Patient did not participate with bedside discussion. Per Family patient cant return home to live alone, she has been declining. We discussed palliative/ hospice consults. We will still pursue snf placement this am as well.   Sanya Petty

## 2023-01-03 NOTE — PROGRESS NOTES
Problem: Mobility Impaired (Adult and Pediatric)  Goal: *Acute Goals and Plan of Care (Insert Text)  Description: FUNCTIONAL STATUS PRIOR TO ADMISSION: Pt was at [facility] for rehab prior to admission. Per family, pt was able to ambulate ~100' with RW at [facility],  had 3 falls and COVID while at [facility]. . Prior to SNF and previous hospital admission, pt was living alone and was IND with mobility and ADL's without use of DME.    HOME SUPPORT PRIOR TO ADMISSION: The patient lived alone with son to provide assistance. Pt's son lives~50 miles away. Physical Therapy Goals  Initiated 1/3/2023  1. Patient will move from supine to sit and sit to supine  in bed with minimal assistance/contact guard assist within 7 day(s). 2.  Patient will transfer from bed to chair and chair to bed with minimal assistance/contact guard assist using the least restrictive device within 7 day(s). 3.  Patient will perform sit to stand with minimal assistance/contact guard assist within 7 day(s). 4.  Patient will ambulate with minimal assistance/contact guard assist for 50 feet with the least restrictive device within 7 day(s). 5.  Patient will participate in lower extremity therapeutic exercise with independence within 7 day(s). Outcome: Not Met     PHYSICAL THERAPY EVALUATION  Patient: Basilia Tucker (80 y.o. female)  Date: 1/3/2023  Primary Diagnosis: DEBORAH (acute kidney injury) ([facility] [address] [N17.9]       Precautions:   Fall, Skin    ASSESSMENT    Based on current observations, pt  presents with confusion, decreased safety awareness, deficits in generalized strength/AROM,  sitting and standing balance, functional activity tolerance, coordination, and cognition/confusion impacting overall performance of functional transfers/mobility following fall at SNF.  Pt needs modA for bed mobility with additional time,  demonstrates fair dynamic sitting balance, requires max cues for pursed lip Dbex, as pt is a mouth breather and gets SOB easily, SPO2 - 96%. Pt with increased anxiety prior to transfers, provided with reassurance and encouragement. Requires min/modA for sit <>stand and bed<>chair transfers with cues for hand placement,  is able to take few lateral steps towards the chair- 3' with RW, min/modA with shuffling step to gait, decreased step length and decreased foot clearance b/l Le. Recommend d/c to SNF when medically appropriate. Current Level of Function Impacting Discharge (mobility/balance): Pt requires min/modA for transfers/mobility with RW    Functional Outcome Measure: The patient scored 11 on the Tinetti outcome measure which is indicative of high complexity. Other factors to consider for discharge: Decline from functional baseline     Patient will benefit from skilled therapy intervention to address the above noted impairments. PLAN :  Recommendations and Planned Interventions: bed mobility training, transfer training, gait training, therapeutic exercises, neuromuscular re-education, patient and family training/education, and therapeutic activities      Frequency/Duration: Patient will be followed by physical therapy:  5 times a week to address goals.     Recommendation for discharge: (in order for the patient to meet his/her long term goals)  Therapy up to 5 days/week in SNF setting    This discharge recommendation:  Has been made in collaboration with the attending provider and/or case management    IF patient discharges home will need the following DME: to be determined (TBD)         SUBJECTIVE:   Patient stated  Am I in [facility]    OBJECTIVE DATA SUMMARY:   HISTORY:    Past Medical History:   Diagnosis Date    Anxiety and depression     Diverticular disease of colon     High cholesterol     HTN (hypertension)     Hypothyroid     IBS (irritable bowel syndrome)      Past Surgical History:   Procedure Laterality Date    ENDOSCOPY, COLON, DIAGNOSTIC  3/10    Tics        o/w  neg    HX CATARACT REMOVAL HX CHOLECYSTECTOMY      HX GYN  1985    hysterectomy    HX KNEE REPLACEMENT  8/2012    Right    HI ERCP W/SPHINCTEROTOMY/PAPILLOTOMY  6/01       Personal factors and/or comorbidities impacting plan of care:     Home Situation  Home Environment: Private residence  # Steps to Enter: 2  Rails to Enter: Yes  One/Two Story Residence: One story  Living Alone: Yes  Support Systems: Child(edilia)    EXAMINATION/PRESENTATION/DECISION MAKING:   Critical Behavior:  Neurologic State: Alert, Confused  Orientation Level: Oriented to person, Disoriented to time, Disoriented to situation, Disoriented to place  Cognition: Follows commands  Safety/Judgement: Decreased awareness of need for safety, Decreased awareness of need for assistance, Decreased awareness of environment, Decreased insight into deficits    Skin:  intact where exposed    Range Of Motion:  AROM: Generally decreased, functional    Strength:    Strength: Generally decreased, functional (-3/5 grossly for b/l Le)    Tone & Sensation:   Tone: Normal    Sensation: Intact    Coordination:  Coordination: Generally decreased, functional    Functional Mobility:  Bed Mobility:  Rolling: Moderate assistance  Supine to Sit: Moderate assistance; Additional time;Assist x1;Bed Modified     Scooting: Minimum assistance (towards the EOB)  Transfers:  Sit to Stand: Minimum assistance; Moderate assistance; Adaptive equipment;Assist x1  Stand to Sit: Minimum assistance; Moderate assistance;Assist x1;Additional time; Adaptive equipment        Bed to Chair: Minimum assistance; Moderate assistance; Adaptive equipment;Assist x1;Additional time    Balance:   Sitting: Impaired; Without support  Sitting - Static: Good (unsupported)  Sitting - Dynamic: Fair (occasional)  Standing: Impaired; With support  Standing - Static: Fair;Constant support  Standing - Dynamic : Poor;Constant support  Ambulation/Gait Training:  Distance (ft): 3 Feet (ft)  Assistive Device: Walker, rolling;Gait belt  Ambulation - Level of Assistance: Moderate assistance;Assist x1    Gait Abnormalities: Decreased step clearance;Shuffling gait; Step to gait    Base of Support: Narrowed     Speed/Lorna: Shuffled  Step Length: Left shortened;Right shortened    Interventions: Verbal cues; Tactile cues (cues for safety, sequencing and pursed lip breathing)      Functional Measure:  Tinetti test:    Sitting Balance: 1  Arises: 0  Attempts to Rise: 0  Immediate Standing Balance: 1  Standing Balance: 1  Nudged: 0  Eyes Closed: 0  Turn 360 Degrees - Continuous/Discontinuous: 0  Turn 360 Degrees - Steady/Unsteady: 0  Sitting Down: 1  Balance Score: 4 Balance total score  Indication of Gait: 1  R Step Length/Height: 0  L Step Length/Height: 0  R Foot Clearance: 1  L Foot Clearance: 1  Step Symmetry: 1  Step Continuity: 1  Path: 1  Trunk: 1  Walking Time: 0  Gait Score: 7 Gait total score  Total Score: 11/28 Overall total score         Tinetti Tool Score Risk of Falls  <19 = High Fall Risk  19-24 = Moderate Fall Risk  25-28 = Low Fall Risk  Tinetti ME. Performance-Oriented Assessment of Mobility Problems in Elderly Patients. [place] 66; [id number].  (Scoring Description: PT Bulletin Feb. 10, 1993)    Older adults: Shasha Lara et al, 2009; n = 1000 [facility] elderly evaluated with ABC, JANIE, ADL, and IADL)  · Mean JANIE score for males aged 69-68 years = 26.21(3.40)  · Mean JANIE score for females age 69-68 years = 25.16(4.30)  · Mean JANIE score for males over 80 years = 23.29(6.02)  · Mean JANIE score for females over 80 years = 17.20(8.32)           Physical Therapy Evaluation Charge Determination   History Examination Presentation Decision-Making   MEDIUM  Complexity : 1-2 comorbidities / personal factors will impact the outcome/ POC  MEDIUM Complexity : 3 Standardized tests and measures addressing body structure, function, activity limitation and / or participation in recreation  LOW Complexity : Stable, uncomplicated  Other outcome measures Tinetti  HIGH       Based on the above components, the patient evaluation is determined to be of the following complexity level: LOW     Pain Ratin/10    Activity Tolerance:   Fair, requires rest breaks, and observed SOB with activity    After treatment patient left in no apparent distress:   Sitting in chair, Call bell within reach, Bed / chair alarm activated, and nsg updated    COMMUNICATION/EDUCATION:   The patients plan of care was discussed with: Registered nurse. Fall prevention education was provided and the patient/caregiver indicated understanding. and Patient/family agree to work toward stated goals and plan of care.     Thank you for this referral.  Ima Smith   Time Calculation: 35 mins

## 2023-01-03 NOTE — PROGRESS NOTES
Spiritual Care Partner Volunteer visited patient at [address] in [facility] 4M POST SURG ORT 2 on 1/3/2023   Documented by:  Rambo Fu [address], Ling [address], [facility]  Staff [address]  Paging service: [phone] (EMMA)

## 2023-01-04 ENCOUNTER — APPOINTMENT (OUTPATIENT)
Dept: GENERAL RADIOLOGY | Age: 88
DRG: 682 | End: 2023-01-04
Attending: STUDENT IN AN ORGANIZED HEALTH CARE EDUCATION/TRAINING PROGRAM
Payer: MEDICARE

## 2023-01-04 PROCEDURE — 97530 THERAPEUTIC ACTIVITIES: CPT

## 2023-01-04 PROCEDURE — 74011000250 HC RX REV CODE- 250: Performed by: INTERNAL MEDICINE

## 2023-01-04 PROCEDURE — 94640 AIRWAY INHALATION TREATMENT: CPT

## 2023-01-04 PROCEDURE — 71045 X-RAY EXAM CHEST 1 VIEW: CPT

## 2023-01-04 PROCEDURE — 74011250637 HC RX REV CODE- 250/637: Performed by: INTERNAL MEDICINE

## 2023-01-04 PROCEDURE — 65270000029 HC RM PRIVATE

## 2023-01-04 PROCEDURE — 74011250636 HC RX REV CODE- 250/636: Performed by: INTERNAL MEDICINE

## 2023-01-04 RX ORDER — CITALOPRAM 20 MG/1
30 TABLET, FILM COATED ORAL DAILY
Status: DISCONTINUED | OUTPATIENT
Start: 2023-01-05 | End: 2023-01-06 | Stop reason: HOSPADM

## 2023-01-04 RX ORDER — LEVOTHYROXINE SODIUM 100 UG/1
100 TABLET ORAL
Qty: 30 TABLET | Refills: 0 | Status: SHIPPED | OUTPATIENT
Start: 2023-01-04 | End: 2023-02-03

## 2023-01-04 RX ORDER — ALBUTEROL SULFATE 1.25 MG/3ML
1.25 SOLUTION RESPIRATORY (INHALATION)
Status: DISCONTINUED | OUTPATIENT
Start: 2023-01-04 | End: 2023-01-06 | Stop reason: HOSPADM

## 2023-01-04 RX ADMIN — ALBUTEROL SULFATE 1.25 MG: 1.25 SOLUTION RESPIRATORY (INHALATION) at 22:01

## 2023-01-04 RX ADMIN — ALBUTEROL SULFATE 1.25 MG: 1.25 SOLUTION RESPIRATORY (INHALATION) at 17:19

## 2023-01-04 RX ADMIN — ENOXAPARIN SODIUM 30 MG: 100 INJECTION SUBCUTANEOUS at 08:38

## 2023-01-04 RX ADMIN — LEVOTHYROXINE SODIUM 112 MCG: 0.11 TABLET ORAL at 07:14

## 2023-01-04 RX ADMIN — DONEPEZIL HYDROCHLORIDE 10 MG: 5 TABLET, FILM COATED ORAL at 21:29

## 2023-01-04 RX ADMIN — CITALOPRAM HYDROBROMIDE 20 MG: 20 TABLET ORAL at 08:38

## 2023-01-04 NOTE — PROGRESS NOTES
Pt hypotensive. Pt is asymptomatic. Pt denies any discomfort. MD on call notified. Receive new order for bolus. Will continue to monitor.

## 2023-01-04 NOTE — DISCHARGE SUMMARY
Physician Discharge Summary     Patient ID:  Priyanka Abbott  [id number]  80 y.o.  8/1/1930    Admit date: 12/31/2022    Discharge date of service and time: 1/5/2023  Greater than 30 minutes were spent providing discharge related services for this patient    Admission Diagnoses: DEBORAH (acute kidney injury) [facility]) [N17.9]    Discharge Diagnoses:    Principal Diagnosis     Debilitated patient                                              Hospital Course and other diagnoses  Debilitated patient / Laird Elliott - Worse since first admission weeks ago. We discharged her to [facility] 2 weeks ago. Son is concerned with her care there. CM consulted. She is ready to go now to any other SNF. DEBORAH (acute kidney injury) / Hypokalemia / Dehydration - POA presume due to poor PO intake. Recurrent. Hydrated and improved. May have CKD3. Acute deep venous thrombosis / Elevated d-dimer - POA, new 2 weeks ago. No hemodynamic compromise or hypoxia. US found one small DVT in L peroneal vein. VQ scan was intermediate probability. However she has iron deficient anemia and is hemoccult positive. I did not recommend aggressive treatment. He son concurred with plan and we did not start treatment. Anemia / Thrombocytopenia - Stable. Low iron on recent serologies. Give PO iron at home     HTN (hypertension) - BP remains low normal. No longer on lisinopril     Hypothyroid - TSH normal. Continue synthroid     Dementia / Anxiety and depression / IBS (irritable bowel syndrome) - POA, stable. Continue donepazil and citalopram.  I do not think a sitter has ever been warranted. COVID infection - Per family Dx 15 days ago at [facility]. No symptoms. PCP: Kita Driscoll MD    Consults: None    Significant Diagnostic Studies: See Hospital Course    Discharged home in improved condition.     Discharge Exam:  /63 (BP 1 Location: Right upper arm, BP Patient Position: At rest)   Pulse 79   Temp 97.5 °F (36.4 °C)   Resp 17   Ht 5' 5\" (1.651 m)   Wt 63.5 kg (140 lb)   SpO2 95%   BMI 23.30 kg/m²      Gen:  Frail, in no acute distress  HEENT:  Pink conjunctivae, PERRL, hearing intact to voice, moist mucous membranes  Neck:  Supple, without masses, thyroid non-tender  Resp:  No accessory muscle use, clear breath sounds without wheezes rales or rhonchi  Card:  No murmurs, normal S1, S2 without thrills, bruits or peripheral edema  Abd:  Soft, non-tender, non-distended, normoactive bowel sounds are present, no mass  Lymph:  No cervical or inguinal adenopathy  Musc:  No cyanosis or clubbing  Skin:  laceration, bruising, skin turgor is good  Neuro:  Cranial nerves are grossly intact, general motor weakness, follows commands   Psych:  Poor insight, oriented to person, place      Patient Instructions:   Current Discharge Medication List        CONTINUE these medications which have CHANGED    Details   levothyroxine (SYNTHROID) 100 mcg tablet Take 1 Tablet by mouth Daily (before breakfast) for 30 days. Qty: 30 Tablet, Refills: 0           CONTINUE these medications which have NOT CHANGED    Details   donepeziL (ARICEPT) 10 mg tablet TAKE 1 TABLET EVERY NIGHT  Qty: 90 Tablet, Refills: 3      citalopram (CELEXA) 20 mg tablet TAKE 1 AND 1/2 TABLETS EVERY DAY  Qty: 135 Tablet, Refills: 3    Associated Diagnoses: Depression with anxiety           Activity: Activity as tolerated and PT/OT Eval and Treat  Diet: Regular Diet  Wound Care: None needed    Follow-up with your PCP in a few weeks.   Follow-up tests/labs - none    Signed:  Carmen Yost MD  1/5/2023  11:31 AM

## 2023-01-04 NOTE — PROGRESS NOTES
Problem: Mobility Impaired (Adult and Pediatric)  Goal: *Acute Goals and Plan of Care (Insert Text)  Description: FUNCTIONAL STATUS PRIOR TO ADMISSION: Pt was at [facility] for rehab prior to admission. Per family, pt was able to ambulate ~100' with RW at SNF,  had 3 falls and COVID while at [employer]. . Prior to SNF and previous hospital admission, pt was living alone and was IND with mobility and ADL's without use of DME.    HOME SUPPORT PRIOR TO ADMISSION: The patient lived alone with son to provide assistance. Pt's son lives~50 miles away. Physical Therapy Goals  Initiated 1/3/2023  1. Patient will move from supine to sit and sit to supine  in bed with minimal assistance/contact guard assist within 7 day(s). 2.  Patient will transfer from bed to chair and chair to bed with minimal assistance/contact guard assist using the least restrictive device within 7 day(s). 3.  Patient will perform sit to stand with minimal assistance/contact guard assist within 7 day(s). 4.  Patient will ambulate with minimal assistance/contact guard assist for 50 feet with the least restrictive device within 7 day(s). 5.  Patient will participate in lower extremity therapeutic exercise with independence within 7 day(s). Outcome: Progressing Towards Goal  Note:   PHYSICAL THERAPY TREATMENT  Patient: Valentina Castelan (80 y.o. female)  Date: 1/4/2023  Diagnosis: [facility] [N17.9] <principal problem not specified>      Precautions: Fall, Skin  Chart, physical therapy assessment, plan of care and goals were reviewed. ASSESSMENT  Patient continues with skilled PT services and progressing towards goals. Fatigues quickly with limited activity, dyspneic with audible wheezing noted throughout session. Requiring Mod/Max A for bed mobility with Min A for tranfers using RW. Pt requesting to return to bed despite max encouragement to sit in chair reporting abdominal discomfort.  RN notified Current Level of Function Impacting Discharge (mobility/balance): Mod A    Other factors to consider for discharge:          PLAN :  Patient continues to benefit from skilled intervention to address the above impairments. Continue treatment per established plan of care. to address goals. Recommendation for discharge: (in order for the patient to meet his/her long term goals)  Therapy up to 5 days/week in SNF setting    This discharge recommendation:  Has been made in collaboration with the attending provider and/or case management    IF patient discharges home will need the following DME: to be determined (TBD)       SUBJECTIVE:   Patient stated I am feeling od.     OBJECTIVE DATA SUMMARY:   Critical Behavior:  Neurologic State: Alert, Confused  Orientation Level: Oriented to person  Cognition: Follows commands  Safety/Judgement: Decreased awareness of need for safety, Decreased awareness of need for assistance, Decreased awareness of environment, Decreased insight into deficits  Functional Mobility Training:  Bed Mobility:  Rolling: Moderate assistance  Supine to Sit: Moderate assistance;Maximum assistance;Assist x1;Additional time  Sit to Supine: Maximum assistance;Assist x1  Scooting: Minimum assistance;Assist x1        Transfers:  Sit to Stand: Minimum assistance;Assist x1;Additional time  Stand to Sit: Minimum assistance;Assist x1;Additional time        Bed to Chair: Minimum assistance;Assist x1                    Balance:  Sitting: High guard  Standing: Impaired; With support  Standing - Static: Constant support; Fair  Standing - Dynamic : Constant support; Fair  Ambulation/Gait Training:  Distance (ft): 5 Feet (ft)  Assistive Device: Walker, rolling;Gait belt  Ambulation - Level of Assistance: Minimal assistance;Assist x1                 Base of Support: Narrowed     Speed/Lorna: Shuffled                       Stairs:               Therapeutic Exercises:     Pain Rating:      Activity Tolerance: Fair, requires rest breaks, and observed SOB with activity    After treatment patient left in no apparent distress:   Supine in bed, Call bell within reach, and Bed / chair alarm activated    COMMUNICATION/COLLABORATION:   The patients plan of care was discussed with: Registered nurse.      Perri Bejarano   Time Calculation: 31 mins

## 2023-01-04 NOTE — PROGRESS NOTES
1/4/22  4:40 PM    Care Management Daily Progress Note:    CM spoke to [facility], they are reviewing. CM called and spoke to patients son who is agreeable to a referral to [facility]- referral placed. 1. Closed head injury, initial encounter    2. Multiple contusions    3. Weakness    4. DEBORAH (acute kidney injury) ([facility] [address]      1). Awaiting medical stability  2). PT/OT recommending SNF- referrals placed with [facility] and [facility]  3). Patients insurance requires auth  4). TBD on transportation  5).  CM following for dc needs    [facility]

## 2023-01-04 NOTE — PROGRESS NOTES
[address]  [phone]    Hospitalist Progress Note      NAME: Nishant Espinoza   :  1930  MRM:  [id number]    Date/Time of service 2023  9:13 AM        Assessment and Plan:     Debilitated patient / Falls - Worse since first admission weeks ago. We discharged her to [facility] 2 weeks ago. Son is concerned with her care there. CM consulted. She is ready to go now to any other SNF. DEBORAH (acute kidney injury) / Hypokalemia / Dehydration - POA presume due to poor PO intake. Recurrent. Hydrated and improved. May have CKD3. Acute deep venous thrombosis / Elevated d-dimer - POA, new 2 weeks ago. No hemodynamic compromise or hypoxia. US found one small DVT in L peroneal vein. VQ scan was intermediate probability. However she has iron deficient anemia and is hemoccult positive. I did not recommend aggressive treatment. He son concurred with plan and we did not start treatment. Anemia / Thrombocytopenia - Stable. Low iron on recent serologies. Give PO iron at home     HTN (hypertension) - BP remains low normal. No longer on lisinopril     Hypothyroid - TSH normal. Continue synthroid     Dementia / Anxiety and depression / IBS (irritable bowel syndrome) - POA, stable. Continue donepazil and citalopram.  I do not think a sitter has ever been warranted. COVID infection - Per family Dx 15 days ago at [facility]. No symptoms. Subjective:     Chief Complaint:  weakness a bit better, ready to DC    ROS:  (bold if positive, if negative)    Tolerating some PT  Tolerating some Diet        Objective:     Last 24hrs VS reviewed since prior progress note.  Most recent are:    Visit Vitals  BP (!) 147/63 (BP 1 Location: Right upper arm, BP Patient Position: Semi fowlers)   Pulse 64   Temp 97.7 °F (36.5 °C)   Resp 17   Ht 5' 5\" (1.651 m)   Wt 63.5 kg (140 lb)   SpO2 95%   BMI 23.30 kg/m²     SpO2 Readings from Last 6 Encounters: 01/04/23 95%   12/14/22 94%   08/23/12 100%   08/08/12 98%          Intake/Output Summary (Last 24 hours) at 1/4/2023 0913  Last data filed at 1/4/2023 0840  Gross per 24 hour   Intake 320 ml   Output 800 ml   Net -480 ml          Physical Exam:    Gen:  Well-developed, well-nourished, in no acute distress  HEENT:  Pink conjunctivae, PERRL, hearing intact to voice, moist mucous membranes  Neck:  Supple, without masses, thyroid non-tender  Resp:  No accessory muscle use, clear breath sounds without wheezes rales or rhonchi  Card:  No murmurs, normal S1, S2 without thrills, bruits or peripheral edema  Abd:  Soft, non-tender, non-distended, normoactive bowel sounds are present, no mass  Lymph:  No cervical or inguinal adenopathy  Musc:  No cyanosis or clubbing  Skin:  laceration, bruising, skin turgor is good  Neuro:  Cranial nerves are grossly intact, general motor weakness, follows commands   Psych:  Poor insight, oriented to person, place     Telemetry reviewed:   normal sinus rhythm  __________________________________________________________________  Medications Reviewed: (see below)  Medications:     Current Facility-Administered Medications   Medication Dose Route Frequency    acetaminophen (TYLENOL) tablet 650 mg  650 mg Oral Q6H PRN    Or    acetaminophen (TYLENOL) suppository 650 mg  650 mg Rectal Q6H PRN    polyethylene glycol (MIRALAX) packet 17 g  17 g Oral DAILY PRN    ondansetron (ZOFRAN ODT) tablet 4 mg  4 mg Oral Q8H PRN    Or    ondansetron (ZOFRAN) injection 4 mg  4 mg IntraVENous Q6H PRN    dextrose 5% - 0.9% NaCl with KCl 40 mEq/L infusion   IntraVENous CONTINUOUS    enoxaparin (LOVENOX) injection 30 mg  30 mg SubCUTAneous DAILY    citalopram (CELEXA) tablet 20 mg  20 mg Oral DAILY    donepeziL (ARICEPT) tablet 10 mg  10 mg Oral QHS    levothyroxine (SYNTHROID) tablet 112 mcg  112 mcg Oral ACB        Lab Data Reviewed: (see below)  Lab Review:     Recent Labs     01/02/23  0034   WBC 11.8*   HGB 9.7*   HCT 30.0*   *       Recent Labs     01/02/23  0034      K 3.5      CO2 28   *   BUN 68*   CREA 1.54*   CA 8.0*   MG 2.1   ALB 3.0*   TBILI 0.9   ALT 31       No results found for: GLUCPOC  No results for input(s): PH, PCO2, PO2, HCO3, FIO2 in the last 72 hours. No results for input(s): INR, INREXT, INREXT in the last 72 hours. All Micro Results       Procedure Component Value Units Date/Time    URINE CULTURE HOLD SAMPLE [[id number]] Collected: 01/01/23 0504    Order Status: Completed Specimen: Urine Updated: 01/01/23 0548     Urine culture hold       Urine on hold in Microbiology dept for 2 days. If unpreserved urine is submitted, it cannot be used for addtional testing after 24 hours, recollection will be required. Other pertinent lab: none    Total time spent with patient: 30 Minutes I personally reviewed chart, notes, data and current medications in the medical record. I have personally examined and treated the patient at bedside during this period. To assist coordination of care and communication with nursing and staff, this note may be preliminary early in the day, but finalized by end of the day.                  Care Plan discussed with: Patient, Care Manager, Nursing Staff, and >50% of time spent in counseling and coordination of care    Discussed:  Care Plan and D/C Planning    Prophylaxis:  Lovenox and H2B/PPI    Disposition:  SNF/LTC           ___________________________________________________    Attending Physician: Cornelius Villafuerte MD

## 2023-01-04 NOTE — PROGRESS NOTES
Problem: Falls - Risk of  Goal: *Absence of Falls  Description: Document Samantha Etienne Fall Risk and appropriate interventions in the flowsheet. Outcome: Progressing Towards Goal  Note: Fall Risk Interventions:                                Problem: Patient Education: Go to Patient Education Activity  Goal: Patient/Family Education  Outcome: Progressing Towards Goal     Problem: Pressure Injury - Risk of  Goal: *Prevention of pressure injury  Description: Document Roberto Scale and appropriate interventions in the flowsheet.   Outcome: Progressing Towards Goal  Note: Pressure Injury Interventions:  Sensory Interventions: Keep linens dry and wrinkle-free, Maintain/enhance activity level, Float heels, Minimize linen layers    Moisture Interventions: Absorbent underpads, Minimize layers    Activity Interventions: Increase time out of bed    Mobility Interventions: PT/OT evaluation    Nutrition Interventions: Document food/fluid/supplement intake    Friction and Shear Interventions: HOB 30 degrees or less, Minimize layers, Lift team/patient mobility team                Problem: Patient Education: Go to Patient Education Activity  Goal: Patient/Family Education  Outcome: Progressing Towards Goal

## 2023-01-05 LAB
COVID-19 RAPID TEST, COVR: DETECTED
SOURCE, COVRS: ABNORMAL

## 2023-01-05 PROCEDURE — 65270000029 HC RM PRIVATE

## 2023-01-05 PROCEDURE — 74011250636 HC RX REV CODE- 250/636: Performed by: INTERNAL MEDICINE

## 2023-01-05 PROCEDURE — 87635 SARS-COV-2 COVID-19 AMP PRB: CPT

## 2023-01-05 PROCEDURE — 97116 GAIT TRAINING THERAPY: CPT

## 2023-01-05 PROCEDURE — 74011250637 HC RX REV CODE- 250/637: Performed by: INTERNAL MEDICINE

## 2023-01-05 PROCEDURE — 94761 N-INVAS EAR/PLS OXIMETRY MLT: CPT

## 2023-01-05 PROCEDURE — 97530 THERAPEUTIC ACTIVITIES: CPT

## 2023-01-05 RX ADMIN — ENOXAPARIN SODIUM 30 MG: 100 INJECTION SUBCUTANEOUS at 08:40

## 2023-01-05 RX ADMIN — LEVOTHYROXINE SODIUM 112 MCG: 0.11 TABLET ORAL at 07:00

## 2023-01-05 RX ADMIN — ACETAMINOPHEN 650 MG: 325 TABLET ORAL at 11:09

## 2023-01-05 RX ADMIN — DONEPEZIL HYDROCHLORIDE 10 MG: 5 TABLET, FILM COATED ORAL at 23:11

## 2023-01-05 RX ADMIN — CITALOPRAM HYDROBROMIDE 30 MG: 20 TABLET ORAL at 08:40

## 2023-01-05 NOTE — PROGRESS NOTES
Problem: Falls - Risk of  Goal: *Absence of Falls  Description: Document [place] Flow Fall Risk and appropriate interventions in the flowsheet. Outcome: Progressing Towards Goal  Note: Fall Risk Interventions:                                Problem: Pressure Injury - Risk of  Goal: *Prevention of pressure injury  Description: Document Roberto Scale and appropriate interventions in the flowsheet.   Outcome: Progressing Towards Goal  Note: Pressure Injury Interventions:  Sensory Interventions: Assess changes in LOC    Moisture Interventions: Absorbent underpads    Activity Interventions: Increase time out of bed    Mobility Interventions: Assess need for specialty bed    Nutrition Interventions: Discuss nutritional consult with provider, Document food/fluid/supplement intake    Friction and Shear Interventions: Apply protective barrier, creams and emollients

## 2023-01-05 NOTE — PROGRESS NOTES
Problem: Patient Education: Go to Patient Education Activity  Goal: Patient/Family Education  1/5/2023 0341 by Kelly Green RN  Outcome: Progressing Towards Goal  1/5/2023 0339 by Kelly Green RN  Outcome: Progressing Towards Goal     Problem: Patient Education: Go to Patient Education Activity  Goal: Patient/Family Education  1/5/2023 0341 by Kelly Green RN  Outcome: Progressing Towards Goal  1/5/2023 0339 by Kelly Green RN  Outcome: Progressing Towards Goal

## 2023-01-05 NOTE — PROGRESS NOTES
3:49 PM  Patients rapid Covid came back positive but updated [facility] that patient had COVID 20 days ago, they can still take patient and understand she may remain COVID +. [facility]    2:17 PM  CM spoke to admissions at [facility]- they are able to accept and will start auth today. CM called patients son and he is agreeable- they only have double rooms at this time- he is aware, wanted CM to check with patient. CM met with patient in the room and she is agreeable to a double room at [facility]. CM called back to admissions and confirmed they will start auth. CM requested a Rapid Covid test from MD- a negative covid test is required within 24 hours of admission. [facility]      1/5/22  12:07 PM    Care Management Daily Progress Note:    CM spoke received a message from [facility], they are unable to accept- out of network. CM spoke to patients son and he was agreeable to a referral to [facility] and [facility]. Referrals placed- followed up with a call to [facility]- left a  and spoke to admissions at [facility]- they are reviewing. 1. Closed head injury, initial encounter    2. Multiple contusions    3. Weakness    4. DEBORAH (acute kidney injury) ([facility] [address]       1). Awaiting medical stability  2). PT/OT recommending SNF- referrals placed with [facility] and [facility]  3). Patients insurance requires auth  4). TBD on transportation  5).  CM following for dc needs    [facility]

## 2023-01-05 NOTE — PROGRESS NOTES
Problem: Self Care Deficits Care Plan (Adult)  Goal: *Acute Goals and Plan of Care (Insert Text)  Description: FUNCTIONAL STATUS PRIOR TO ADMISSION: Patient was independent and active without use of DME. Per family prior to hospitalization and admission to [facility] she was living alone, cooking for herself, Independent basic ADL's. Per her son she ambulated ~100' with rolling walker at rehab. Per his report she had 3 falls at rehab and COVID while at [facility]: The patient lived alone with her son to provide assistance. Patient's son lives ~50 miles away    Occupational Therapy Goals  Initiated 1/2/2023  1. Patient will perform static standing with bilateral UE support on rolling walker > or = 5 minutes with minimal assistance/contact guard assist within 7 day(s). 2.  Patient will perform upper body dressing with supervision/set-up within 7 day(s). 3.  Patient will perform lower body dressing with moderate assistance and adaptive equipment prn within 7 day(s). 4.  Patient will perform toilet transfers with minimal assistance/contact guard assist using rolling walker within 7 day(s). 5.  Patient will perform all aspects of toileting with moderate assistance  within 7 day(s). Outcome: Progressing Towards Goal   OCCUPATIONAL THERAPY TREATMENT  Patient: Carlos Ruggiero (80 y.o. female)  Date: 1/5/2023  Diagnosis: DEBORAH (acute kidney injury) ([facility] [address] [N17.9] <principal problem not specified>      Precautions: Fall, Skin  Chart, occupational therapy assessment, plan of care, and goals were reviewed. ASSESSMENT  Patient continues with skilled OT services and is progressing towards goals. Patient demonstrates improving sitting balance, sitting and standing tolerance,  and increased participation and independence with all ADL tasks. Patient is still well below PLOF and is fearful of falling, is impacted by R thigh pain, decreased standing tolerance.  She will benefit from SNF level rehab services prior at discharge. Current Level of Function Impacting Discharge (ADLs): mod assist for LB bathing/dressing, Max- total assist for footwear management, Mod- Max A for toileting     Other factors to consider for discharge:          PLAN :  Patient continues to benefit from skilled intervention to address the above impairments. Continue treatment per established plan of care to address goals. Recommend with staff: in chair for meals, BSC use     Recommend next OT session: AE for LB ADL, bathroom mobility, grooming in stand     Recommendation for discharge: (in order for the patient to meet his/her long term goals)  Therapy up to 5 days/week in SNF setting    This discharge recommendation:  Has been made in collaboration with the attending provider and/or case management    IF patient discharges home will need the following DME: TBD. SUBJECTIVE:   Patient pleasant and cooperative    OBJECTIVE DATA SUMMARY:   Cognitive/Behavioral Status:  Neurologic State: Alert  Orientation Level: Oriented to person;Disoriented to place; Disoriented to situation;Disoriented to time  Cognition: Follows commands;Poor safety awareness             Functional Mobility and Transfers for ADLs:  Bed Mobility:  Supine to Sit: Minimum assistance;Bed Modified;Assist x1;Additional time; Adaptive equipment    Transfers:  Sit to Stand: Minimum assistance  Functional Transfers  Adaptive Equipment: Walker (comment)  Bed to Chair: Minimum assistance    Balance:  Sitting: Intact; Without support  Standing: Impaired  Standing - Static: Unsupported; Fair  Standing - Dynamic : Constant support; Fair    ADL Intervention:      Patient instructed in shaq dressing techniques for LE pain management with fair carry over noted- continued training recommended.  AE training may be beneficial if pain continues to be limiting factor    Lower Body Bathing  Perineal  : Minimum assistance  Position Performed: Standing  Adaptive Equipment: [address] Gown: Minimum  assistance    Lower Body Dressing Assistance  Underpants: Moderate assistance  Socks: Maximum assistance  Position Performed: Bending forward method;Seated edge of bed    Toileting  Bladder Hygiene: Maximum assistance (incontinent)         Pain:  R lateral thigh pain in area of bruise (remnant of injury 3+ weeks ago)    Activity Tolerance:   requires rest breaks    After treatment patient left in no apparent distress:   Sitting in chair, Call bell within reach, Bed / chair alarm activated, and Caregiver / family present    COMMUNICATION/COLLABORATION:   The patients plan of care was discussed with: Physical therapist and Registered nurse.      Mal Frazier OT  Time Calculation: 19 mins

## 2023-01-05 NOTE — PROGRESS NOTES
Bedside shift change report given to Daphne (oncoming nurse) by Darrell Lopez (offgoing nurse). Report included the following information SBAR, Intake/Output, MAR, Recent Results, and Med Rec Status.

## 2023-01-05 NOTE — PROGRESS NOTES
Late entry  At 2000 Patient noted with crackles bilateral on upper lung fields, no complaints of difficulty in breathing. Oncall Dr. Bridget Bocanegra informed. At 2030 Chest xray done. Patient needs attended, repositioned to sides. At 2100 patient needs attended, repositioned. Marked right side bruise with skin marker. Informed Respiratory Tech about the patient's wheezing and crackles. She will come by to see the patient.

## 2023-01-05 NOTE — PROGRESS NOTES
Problem: Mobility Impaired (Adult and Pediatric)  Goal: *Acute Goals and Plan of Care (Insert Text)  Description: FUNCTIONAL STATUS PRIOR TO ADMISSION: Pt was at [facility] for rehab prior to admission. Per family, pt was able to ambulate ~100' with RW at SNF,  had 3 falls and COVID while at [employer]. . Prior to SNF and previous hospital admission, pt was living alone and was IND with mobility and ADL's without use of DME.    HOME SUPPORT PRIOR TO ADMISSION: The patient lived alone with son to provide assistance. Pt's son lives~50 miles away. Physical Therapy Goals  Initiated 1/3/2023  1. Patient will move from supine to sit and sit to supine  in bed with minimal assistance/contact guard assist within 7 day(s). 2.  Patient will transfer from bed to chair and chair to bed with minimal assistance/contact guard assist using the least restrictive device within 7 day(s). 3.  Patient will perform sit to stand with minimal assistance/contact guard assist within 7 day(s). 4.  Patient will ambulate with minimal assistance/contact guard assist for 50 feet with the least restrictive device within 7 day(s). 5.  Patient will participate in lower extremity therapeutic exercise with independence within 7 day(s). Outcome: Progressing Towards Goal   PHYSICAL THERAPY TREATMENT  Patient: Andi Magdaleno (80 y.o. female)  Date: 1/5/2023  Diagnosis: [facility] [N17.9] <principal problem not specified>      Precautions: Fall  Chart, physical therapy assessment, plan of care and goals were reviewed. ASSESSMENT  Patient continues with skilled PT services and is progressing towards goals. Patient agreeable to working with therapy and eager initially. Demonstrates improved bed mobility independence with cues for sequencing and strategy, only needed MIN A. Good balance at EOB.   Patient soiled of urine once up to EOB, able to stand with MIN A and maintain with RW support and CG for brief change. Patient also able to maintain balance well with one hand support and use other hand to use wipe to clean upper thighs and perianal region. Returned to sitting and patient then very labile and anxious about transferring to chair. Improved with firm guidance and direction and only needed MIN A for gait. Once up in chair, patient afraid she'd fall out of the chair, improved with elevating legs on legrests. Cont to recommend SNF, patient unsafe for d/c home and a very high fall risk. Current Level of Function Impacting Discharge (mobility/balance): MIN A bed mob, gait bed to chair    Other factors to consider for discharge: lives alone, many falls         PLAN :  Patient continues to benefit from skilled intervention to address the above impairments. Continue treatment per established plan of care. to address goals. Recommendation for discharge: (in order for the patient to meet his/her long term goals)  Therapy up to 5 days/week in SNF setting    This discharge recommendation:  Has been made in collaboration with the attending provider and/or case management    IF patient discharges home will need the following DME: to be determined (TBD)       SUBJECTIVE:   Patient stated I'm so afraid I'll fall.     OBJECTIVE DATA SUMMARY:   Critical Behavior:  Neurologic State: Alert  Orientation Level: Oriented to person, Disoriented to place, Disoriented to situation, Disoriented to time  Cognition: Follows commands, Poor safety awareness  Safety/Judgement: Decreased awareness of need for safety, Decreased awareness of need for assistance, Decreased awareness of environment, Decreased insight into deficits  Functional Mobility Training:  Bed Mobility:     Supine to Sit: Minimum assistance;Bed Modified;Assist x1;Additional time; Adaptive equipment              Transfers:  Sit to Stand: Minimum assistance  Stand to Sit: Minimum assistance        Bed to Chair: Minimum assistance Balance:  Sitting: Intact; Without support  Standing: Impaired  Standing - Static: Unsupported; Fair  Standing - Dynamic : Constant support; Fair  Ambulation/Gait Training:  Distance (ft): 3 Feet (ft)  Assistive Device: Gait belt;Walker, rolling  Ambulation - Level of Assistance: Minimal assistance        Gait Abnormalities: Decreased step clearance;Shuffling gait              Speed/Lorna: Pace decreased (<100 feet/min)  Step Length: Right shortened;Left shortened                      Pain Rating:  R hip pain with certain movements, FACES 6/10 at times    Activity Tolerance:   Fair    After treatment patient left in no apparent distress:   Sitting in chair, Call bell within reach, and Bed / chair alarm activated    COMMUNICATION/COLLABORATION:   The patients plan of care was discussed with: Occupational therapist and Registered nurse.      Laurel Barnes, PT   Time Calculation: 19 mins

## 2023-01-05 NOTE — PROGRESS NOTES
[facility]  [address], [facility] [address]  [phone]    Hospitalist Progress Note      NAME: Federico Escalera   :  1930  MRM:  [id number]    Date/Time of service 2023  11:30 AM        Assessment and Plan:     Debilitated patient / Falls - Worse since first admission weeks ago. We discharged her to [facility] 2 weeks ago. Son is concerned with her care there. CM consulted. She is ready to go now to any other SNF. DEBORAH (acute kidney injury) / Hypokalemia / Dehydration - POA presume due to poor PO intake. Recurrent. Hydrated and improved. May have CKD3. Acute deep venous thrombosis / Elevated d-dimer - POA, new 2 weeks ago. No hemodynamic compromise or hypoxia. US found one small DVT in L peroneal vein. VQ scan was intermediate probability. However she has iron deficient anemia and is hemoccult positive. I did not recommend aggressive treatment. He son concurred with plan and we did not start treatment. Anemia / Thrombocytopenia - Stable. Low iron on recent serologies. Give PO iron at home     HTN (hypertension) - BP remains low normal. No longer on lisinopril     Hypothyroid - TSH normal. Continue synthroid     Dementia / Anxiety and depression / IBS (irritable bowel syndrome) - POA, stable. Continue donepazil and citalopram.  I do not think a sitter has ever been warranted. COVID infection - Per family Dx 15 days ago at [facility]. No symptoms. Subjective:     Chief Complaint:  weakness a bit better, ready to DC to any SNF    ROS:  (bold if positive, if negative)    Tolerating some PT  Tolerating some Diet        Objective:     Last 24hrs VS reviewed since prior progress note.  Most recent are:    Visit Vitals  /63 (BP 1 Location: Right upper arm, BP Patient Position: At rest)   Pulse 79   Temp 97.5 °F (36.4 °C)   Resp 17   Ht 5' 5\" (1.651 m)   Wt 63.5 kg (140 lb)   SpO2 95%   BMI 23.30 kg/m²     SpO2 Readings from Last 6 Encounters:   01/05/23 95%   12/14/22 94%   08/23/12 100%   08/08/12 98%    O2 Flow Rate (L/min): 1 l/min     Intake/Output Summary (Last 24 hours) at 1/5/2023 1130  Last data filed at 1/5/2023 1000  Gross per 24 hour   Intake 60 ml   Output --   Net 60 ml          Physical Exam:    Gen:  Frail, in no acute distress  HEENT:  Pink conjunctivae, PERRL, hearing intact to voice, moist mucous membranes  Neck:  Supple, without masses, thyroid non-tender  Resp:  No accessory muscle use, clear breath sounds without wheezes rales or rhonchi  Card:  No murmurs, normal S1, S2 without thrills, bruits or peripheral edema  Abd:  Soft, non-tender, non-distended, normoactive bowel sounds are present, no mass  Lymph:  No cervical or inguinal adenopathy  Musc:  No cyanosis or clubbing  Skin:  laceration, bruising, skin turgor is good  Neuro:  Cranial nerves are grossly intact, general motor weakness, follows commands   Psych:  Poor insight, oriented to person, place     Telemetry reviewed:   normal sinus rhythm  __________________________________________________________________  Medications Reviewed: (see below)  Medications:     Current Facility-Administered Medications   Medication Dose Route Frequency    citalopram (CELEXA) tablet 30 mg  30 mg Oral DAILY    albuterol (ACCUNEB) nebulizer solution 1.25 mg  1.25 mg Nebulization Q6H PRN    acetaminophen (TYLENOL) tablet 650 mg  650 mg Oral Q6H PRN    Or    acetaminophen (TYLENOL) suppository 650 mg  650 mg Rectal Q6H PRN    polyethylene glycol (MIRALAX) packet 17 g  17 g Oral DAILY PRN    ondansetron (ZOFRAN ODT) tablet 4 mg  4 mg Oral Q8H PRN    Or    ondansetron (ZOFRAN) injection 4 mg  4 mg IntraVENous Q6H PRN    [Held by provider] dextrose 5% - 0.9% NaCl with KCl 40 mEq/L infusion   IntraVENous CONTINUOUS    enoxaparin (LOVENOX) injection 30 mg  30 mg SubCUTAneous DAILY    donepeziL (ARICEPT) tablet 10 mg  10 mg Oral QHS    levothyroxine (SYNTHROID) tablet 112 mcg  112 mcg Oral ACB        Lab Data Reviewed: (see below)  Lab Review:     No results for input(s): WBC, HGB, HCT, PLT, HGBEXT, HCTEXT, PLTEXT, HGBEXT, HCTEXT, PLTEXT in the last 72 hours. No results for input(s): NA, K, CL, CO2, GLU, BUN, CREA, CA, MG, PHOS, ALB, TBIL, TBILI, ALT, INR, INREXT, INREXT in the last 72 hours. No lab exists for component: SGOT    No results found for: GLUCPOC  No results for input(s): PH, PCO2, PO2, HCO3, FIO2 in the last 72 hours. No results for input(s): INR, INREXT, INREXT in the last 72 hours. All Micro Results       Procedure Component Value Units Date/Time    URINE CULTURE HOLD SAMPLE [[id number]] Collected: 01/01/23 0504    Order Status: Completed Specimen: Urine Updated: 01/01/23 0548     Urine culture hold       Urine on hold in Microbiology dept for 2 days. If unpreserved urine is submitted, it cannot be used for addtional testing after 24 hours, recollection will be required. Other pertinent lab: none    Total time spent with patient: 30 Minutes I personally reviewed chart, notes, data and current medications in the medical record. I have personally examined and treated the patient at bedside during this period. To assist coordination of care and communication with nursing and staff, this note may be preliminary early in the day, but finalized by end of the day.                  Care Plan discussed with: Patient, Care Manager, Nursing Staff, and >50% of time spent in counseling and coordination of care    Discussed:  Care Plan and D/C Planning    Prophylaxis:  Lovenox and H2B/PPI    Disposition:  SNF/LTC           ___________________________________________________    Attending Physician: Eliza Chu MD

## 2023-01-05 NOTE — ADT AUTH CERT NOTES
Renal Failure, Acute - Care Day 4 (1/4/2023) by Selena Pa RN       Review Status Review Entered   Completed 1/5/2023 1321       Created By   Selena Pa RN      Criteria Review      Care Day: 4 Care Date: 1/4/2023 Level of Care: Inpatient Floor    Guideline Day 2    Level Of Care    (X) Floor    1/5/2023 13:21:38 EST by Ed Lee      medical bed    Clinical Status    (X) * Electrolyte abnormalities absent or improved    1/5/2023 13:21:38 EST by Ed Lee      improved on 1/2    (X) * Acid-base abnormalities absent or improved    1/5/2023 13:21:38 EST by Ed Lee      not noted    (X) * Hypotension absent    1/5/2023 13:21:38 EST by Ed Lee      VS: T: 97.9, B/P: 129/69, HR 83, RR 16, SPO2 97 1L NC    Activity    (X) Activity as tolerated    1/5/2023 13:21:38 EST by Ed Lee      activity as tolerated w/ assist    Routes    (X) Parenteral or oral hydration    1/5/2023 13:21:38 EST by Ed Lee      D5 NS w/ 40kcl @ 125ml/hr    (X) Parenteral or oral medications    1/5/2023 13:21:38 EST by Ed Lee      albuterol neb q6hrs prn x2,aricept 10mg po qhs,lovenox 30mg sc daily,synthroid 112mcg po daily,celexa 20g po    (X) Oral diet as tolerated    1/5/2023 13:21:38 EST by Ed Lee      adult diet regular    1/5/2023 13:21:38 EST by Ed Lee    Subject: Additional Clinical Information    CXR: No acute cardiopulmonary process. * Milestone   Additional Notes   1/4/2023      CASE MANAGEMENT   Care Management Daily Progress Note:       CM spoke to [facility], they are reviewing. CM called and spoke to patients son who is agreeable to a referral to [facility]- referral placed. 1. Closed head injury, initial encounter    2. Multiple contusions    3. Weakness    4. DEBORAH (acute kidney injury) ([address]        1). Awaiting medical stability   2). PT/OT recommending SNF- referrals placed with [facility] and [facility]   3).  Patients insurance requires auth   4). TBD on transportation   5). CM following for dc needs      HOSPITALIST   Chief Complaint:  weakness a bit better, ready to DC      Physical Exam:   Gen:  Well-developed, well-nourished, in no acute distress   HEENT:  Pink conjunctivae, PERRL, hearing intact to voice, moist mucous membranes   Neck:  Supple, without masses, thyroid non-tender   Resp:  No accessory muscle use, clear breath sounds without wheezes rales or rhonchi   Card:  No murmurs, normal S1, S2 without thrills, bruits or peripheral edema   Abd:  Soft, non-tender, non-distended, normoactive bowel sounds are present, no mass   Lymph:  No cervical or inguinal adenopathy   Musc:  No cyanosis or clubbing   Skin:  laceration, bruising, skin turgor is good   Neuro:  Cranial nerves are grossly intact, general motor weakness, follows commands    Psych:  Poor insight, oriented to person, place       Assessment and Plan:   Debilitated patient / Falls - Worse since first admission weeks ago. We discharged her to [facility] 2 weeks ago. Son is concerned with her care there. CM consulted. She is ready to go now to any other SNF. DEBORAH (acute kidney injury) / Hypokalemia / Dehydration - POA presume due to poor PO intake. Recurrent. Hydrated and improved. May have CKD3. Acute deep venous thrombosis / Elevated d-dimer - POA, new 2 weeks ago. No hemodynamic compromise or hypoxia. US found one small DVT in L peroneal vein. VQ scan was intermediate probability. However she has iron deficient anemia and is hemoccult positive. I did not recommend aggressive treatment. He son concurred with plan and we did not start treatment. Anemia / Thrombocytopenia - Stable. Low iron on recent serologies.  Give PO iron at home       HTN (hypertension) - BP remains low normal. No longer on lisinopril       Hypothyroid - TSH normal. Continue synthroid       Dementia / Anxiety and depression / IBS (irritable bowel syndrome) - POA, stable. Continue donepazil and citalopram.  I do not think a sitter has ever been warranted. COVID infection - Per family Dx 15 days ago at [facility]. No symptoms. PT   ASSESSMENT   Patient continues with skilled PT services and progressing towards goals. Fatigues quickly with limited activity, dyspneic with audible wheezing noted throughout session. Requiring Mod/Max A for bed mobility with Min A for tranfers using RW. Pt requesting to return to bed despite max encouragement to sit in chair reporting abdominal discomfort. RN notified        Current Level of Function Impacting Discharge (mobility/balance): Mod A       Other factors to consider for discharge:             PLAN :   Patient continues to benefit from skilled intervention to address the above impairments. Continue treatment per established plan of care. to address goals.        Recommendation for discharge: (in order for the patient to meet his/her long term goals)   Therapy up to 5 days/week in SNF setting       This discharge recommendation:   Has been made in collaboration with the attending provider and/or case management       IF patient discharges home will need the following DME: to be determined (TBD)              Renal Failure, Acute - Care Day 3 (1/3/2023) by Cleo Patel RN       Review Status Review Entered   Completed 1/5/2023 1313       Created By   Cleo Patel RN      Criteria Review      Care Day: 3 Care Date: 1/3/2023 Level of Care: Inpatient Floor    Guideline Day 2    Level Of Care    (X) Floor    1/5/2023 13:13:23 EST by Shelby Antunez      medical bed    Clinical Status    ( ) * Electrolyte abnormalities absent or improved    1/5/2023 13:13:23 EST by Shelby Antunez      no labs today    ( ) * Acid-base abnormalities absent or improved    1/5/2023 13:13:23 EST by Shelby Antunez      no labs today    (X) * Hypotension absent    1/5/2023 13:13:23 EST by Shelby Antunez      VS: T: 97.3, B/P: 121/53, HR 60, RR 20, SPO2 96 RA    Activity    (X) Activity as tolerated    1/5/2023 13:13:23 EST by Reji Adorno      activity as tolerated w/ assist    Routes    (X) Parenteral or oral hydration    1/5/2023 13:13:23 EST by Reji Adorno      D5 NS w/ 40KCL @ 125ml/hr  LR 1000ml bolus    (X) Parenteral or oral medications    1/5/2023 13:13:23 EST by Reji Adorno      tylenol 650mg po q6hrs prn x1,aricept 10mg po qhs,lovenox 30mg sc daily,synthroid 112mcg po daily,celexa 20mg po daily    (X) Oral diet as tolerated    1/5/2023 13:13:23 EST by Reji Adorno      adult diet regular    Interventions    ( ) Monitor electrolytes, renal function tests, acid-base, and volume status    1/5/2023 13:13:23 EST by Reji Adorno      no labs today    * Milestone   Additional Notes   1/3/2023      HOSPITALIST   Chief Complaint:  weak       Physical Exam:   Gen:  Well-developed, well-nourished, in no acute distress   HEENT:  Pink conjunctivae, PERRL, hearing intact to voice, moist mucous membranes   Neck:  Supple, without masses, thyroid non-tender   Resp:  No accessory muscle use, clear breath sounds without wheezes rales or rhonchi   Card:  No murmurs, normal S1, S2 without thrills, bruits or peripheral edema   Abd:  Soft, non-tender, non-distended, normoactive bowel sounds are present, no mass   Lymph:  No cervical or inguinal adenopathy   Musc:  No cyanosis or clubbing   Skin:  laceration, bruising, skin turgor is good   Neuro:  Cranial nerves are grossly intact, general motor weakness, follows commands    Psych:  Poor insight, oriented to person, place       Assessment and Plan:   DEBORAH (acute kidney injury) / Hypokalemia / Dehydration - POA presume due to poor PO intake. Recurrent. Hydrated and improved. May have CKD3. Debilitated patient / Falls - Worse since first admission weeks ago. We discharged her to [facility] 2 weeks ago. Son is concerned with her care there. CM consulted. She is ready to go now to any other SNF. Acute deep venous thrombosis / Elevated d-dimer - POA, new 2 weeks ago. No hemodynamic compromise or hypoxia. US found one small DVT in L peroneal vein. VQ scan was intermediate probability. However she has iron deficient anemia and is hemoccult positive. I did not recommend aggressive treatment. He son concurred with plan and we did not start treatment. Anemia / Thrombocytopenia - Stable. Low iron on recent serologies. Give PO iron at home       HTN (hypertension) - BP remains low normal. No longer on lisinopril       Hypothyroid - TSH normal. Continue synthroid       Dementia / Anxiety and depression / IBS (irritable bowel syndrome) - POA, stable. Continue donepazil and citalopram.  I do not think a sitter has ever been warranted. COVID infection - Per family Dx 15 days ago at [facility]. No symptoms. PT   ASSESSMENT     Based on current observations, pt  presents with confusion, decreased safety awareness, deficits in generalized strength/AROM,  sitting and standing balance, functional activity tolerance, coordination, and cognition/confusion impacting overall performance of functional transfers/mobility following fall at SNF. Pt needs modA for bed mobility with additional time,  demonstrates fair dynamic sitting balance, requires max cues for pursed lip Dbex, as pt is a mouth breather and gets SOB easily, SPO2 - 96%. Pt with increased anxiety prior to transfers, provided with reassurance and encouragement. Requires min/modA for sit <>stand and bed<>chair transfers with cues for hand placement,  is able to take few lateral steps towards the chair- 3' with RW, min/modA with shuffling step to gait, decreased step length and decreased foot clearance b/l Le. Recommend d/c to SNF when medically appropriate. Current Level of Function Impacting Discharge (mobility/balance): Pt requires min/modA for transfers/mobility with RW       Functional Outcome Measure:   The patient scored 11 on the Tinetti outcome measure which is indicative of high complexity. Other factors to consider for discharge: Decline from functional baseline       Patient will benefit from skilled therapy intervention to address the above noted impairments. PLAN :   Recommendations and Planned Interventions: bed mobility training, transfer training, gait training, therapeutic exercises, neuromuscular re-education, patient and family training/education, and therapeutic activities         Frequency/Duration: Patient will be followed by physical therapy:  5 times a week to address goals.        Recommendation for discharge: (in order for the patient to meet his/her long term goals)   Therapy up to 5 days/week in SNF setting       This discharge recommendation:   Has been made in collaboration with the attending provider and/or case management       IF patient discharges home will need the following DME: to be determined (TBD)

## 2023-01-06 VITALS
DIASTOLIC BLOOD PRESSURE: 77 MMHG | WEIGHT: 140 LBS | RESPIRATION RATE: 22 BRPM | HEART RATE: 85 BPM | HEIGHT: 65 IN | TEMPERATURE: 98 F | BODY MASS INDEX: 23.32 KG/M2 | SYSTOLIC BLOOD PRESSURE: 127 MMHG | OXYGEN SATURATION: 94 %

## 2023-01-06 LAB
ANION GAP SERPL CALC-SCNC: 5 MMOL/L (ref 5–15)
BUN SERPL-MCNC: 22 MG/DL (ref 6–20)
BUN/CREAT SERPL: 28 (ref 12–20)
CALCIUM SERPL-MCNC: 7.8 MG/DL (ref 8.5–10.1)
CHLORIDE SERPL-SCNC: 107 MMOL/L (ref 97–108)
CO2 SERPL-SCNC: 24 MMOL/L (ref 21–32)
CREAT SERPL-MCNC: 0.78 MG/DL (ref 0.55–1.02)
ERYTHROCYTE [DISTWIDTH] IN BLOOD BY AUTOMATED COUNT: 14.5 % (ref 11.5–14.5)
GLUCOSE SERPL-MCNC: 85 MG/DL (ref 65–100)
HCT VFR BLD AUTO: 24.5 % (ref 35–47)
HGB BLD-MCNC: 7.7 G/DL (ref 11.5–16)
MAGNESIUM SERPL-MCNC: 1.8 MG/DL (ref 1.6–2.4)
MCH RBC QN AUTO: 30.8 PG (ref 26–34)
MCHC RBC AUTO-ENTMCNC: 31.4 G/DL (ref 30–36.5)
MCV RBC AUTO: 98 FL (ref 80–99)
NRBC # BLD: 0 K/UL (ref 0–0.01)
NRBC BLD-RTO: 0 PER 100 WBC
PHOSPHATE SERPL-MCNC: 2.3 MG/DL (ref 2.6–4.7)
PLATELET # BLD AUTO: 153 K/UL (ref 150–400)
PMV BLD AUTO: 12.2 FL (ref 8.9–12.9)
POTASSIUM SERPL-SCNC: 4.4 MMOL/L (ref 3.5–5.1)
RBC # BLD AUTO: 2.5 M/UL (ref 3.8–5.2)
SODIUM SERPL-SCNC: 136 MMOL/L (ref 136–145)
WBC # BLD AUTO: 7 K/UL (ref 3.6–11)

## 2023-01-06 PROCEDURE — 83735 ASSAY OF MAGNESIUM: CPT

## 2023-01-06 PROCEDURE — 74011250637 HC RX REV CODE- 250/637: Performed by: INTERNAL MEDICINE

## 2023-01-06 PROCEDURE — 36415 COLL VENOUS BLD VENIPUNCTURE: CPT

## 2023-01-06 PROCEDURE — 85027 COMPLETE CBC AUTOMATED: CPT

## 2023-01-06 PROCEDURE — 94761 N-INVAS EAR/PLS OXIMETRY MLT: CPT

## 2023-01-06 PROCEDURE — 80048 BASIC METABOLIC PNL TOTAL CA: CPT

## 2023-01-06 PROCEDURE — 97530 THERAPEUTIC ACTIVITIES: CPT

## 2023-01-06 PROCEDURE — 74011250636 HC RX REV CODE- 250/636: Performed by: INTERNAL MEDICINE

## 2023-01-06 PROCEDURE — 84100 ASSAY OF PHOSPHORUS: CPT

## 2023-01-06 PROCEDURE — 97535 SELF CARE MNGMENT TRAINING: CPT

## 2023-01-06 PROCEDURE — 97116 GAIT TRAINING THERAPY: CPT

## 2023-01-06 PROCEDURE — 74011000250 HC RX REV CODE- 250: Performed by: INTERNAL MEDICINE

## 2023-01-06 RX ORDER — ZINC SULFATE 50(220)MG
1 CAPSULE ORAL DAILY
Qty: 30 CAPSULE | Refills: 0 | Status: SHIPPED
Start: 2023-01-07

## 2023-01-06 RX ORDER — IPRATROPIUM BROMIDE AND ALBUTEROL SULFATE 2.5; .5 MG/3ML; MG/3ML
3 SOLUTION RESPIRATORY (INHALATION)
Qty: 30 EACH | Refills: 0 | Status: SHIPPED
Start: 2023-01-06 | End: 2023-01-26 | Stop reason: SDUPTHER

## 2023-01-06 RX ORDER — AMOXICILLIN 250 MG
1 CAPSULE ORAL 2 TIMES DAILY
Status: DISCONTINUED | OUTPATIENT
Start: 2023-01-06 | End: 2023-01-06 | Stop reason: HOSPADM

## 2023-01-06 RX ORDER — PANTOPRAZOLE SODIUM 40 MG/1
40 TABLET, DELAYED RELEASE ORAL
Status: DISCONTINUED | OUTPATIENT
Start: 2023-01-06 | End: 2023-01-06 | Stop reason: HOSPADM

## 2023-01-06 RX ORDER — GUAIFENESIN 600 MG/1
600 TABLET, EXTENDED RELEASE ORAL EVERY 12 HOURS
Qty: 30 TABLET | Refills: 0 | Status: SHIPPED
Start: 2023-01-06

## 2023-01-06 RX ORDER — ENOXAPARIN SODIUM 100 MG/ML
40 INJECTION SUBCUTANEOUS EVERY 24 HOURS
Status: DISCONTINUED | OUTPATIENT
Start: 2023-01-07 | End: 2023-01-06 | Stop reason: HOSPADM

## 2023-01-06 RX ORDER — ASCORBIC ACID 500 MG
500 TABLET ORAL DAILY
Qty: 30 TABLET | Refills: 0 | Status: SHIPPED
Start: 2023-01-07

## 2023-01-06 RX ORDER — IPRATROPIUM BROMIDE AND ALBUTEROL SULFATE 2.5; .5 MG/3ML; MG/3ML
3 SOLUTION RESPIRATORY (INHALATION)
Status: DISCONTINUED | OUTPATIENT
Start: 2023-01-06 | End: 2023-01-06 | Stop reason: HOSPADM

## 2023-01-06 RX ORDER — AMOXICILLIN 250 MG
1 CAPSULE ORAL 2 TIMES DAILY
Qty: 30 TABLET | Refills: 0 | Status: SHIPPED
Start: 2023-01-06

## 2023-01-06 RX ORDER — BENZONATATE 100 MG/1
100 CAPSULE ORAL
Qty: 30 CAPSULE | Refills: 0 | Status: SHIPPED
Start: 2023-01-06

## 2023-01-06 RX ORDER — ZINC SULFATE 50(220)MG
1 CAPSULE ORAL DAILY
Status: DISCONTINUED | OUTPATIENT
Start: 2023-01-07 | End: 2023-01-06 | Stop reason: HOSPADM

## 2023-01-06 RX ORDER — PANTOPRAZOLE SODIUM 40 MG/1
40 TABLET, DELAYED RELEASE ORAL
Qty: 60 TABLET | Refills: 0 | Status: SHIPPED
Start: 2023-01-06

## 2023-01-06 RX ORDER — GUAIFENESIN 600 MG/1
600 TABLET, EXTENDED RELEASE ORAL EVERY 12 HOURS
Status: DISCONTINUED | OUTPATIENT
Start: 2023-01-06 | End: 2023-01-06 | Stop reason: HOSPADM

## 2023-01-06 RX ORDER — ALPRAZOLAM 0.5 MG/1
0.5 TABLET ORAL
Status: DISCONTINUED | OUTPATIENT
Start: 2023-01-06 | End: 2023-01-06 | Stop reason: HOSPADM

## 2023-01-06 RX ORDER — BENZONATATE 100 MG/1
100 CAPSULE ORAL
Status: DISCONTINUED | OUTPATIENT
Start: 2023-01-06 | End: 2023-01-06 | Stop reason: HOSPADM

## 2023-01-06 RX ORDER — ASCORBIC ACID 500 MG
500 TABLET ORAL DAILY
Status: DISCONTINUED | OUTPATIENT
Start: 2023-01-07 | End: 2023-01-06 | Stop reason: HOSPADM

## 2023-01-06 RX ADMIN — SODIUM PHOSPHATE, MONOBASIC, MONOHYDRATE: 276; 142 INJECTION, SOLUTION INTRAVENOUS at 10:08

## 2023-01-06 RX ADMIN — LEVOTHYROXINE SODIUM 112 MCG: 0.11 TABLET ORAL at 06:49

## 2023-01-06 RX ADMIN — GUAIFENESIN 600 MG: 600 TABLET ORAL at 13:32

## 2023-01-06 RX ADMIN — CITALOPRAM HYDROBROMIDE 30 MG: 20 TABLET ORAL at 08:12

## 2023-01-06 RX ADMIN — IRON SUCROSE 200 MG: 20 INJECTION, SOLUTION INTRAVENOUS at 08:13

## 2023-01-06 RX ADMIN — ENOXAPARIN SODIUM 30 MG: 100 INJECTION SUBCUTANEOUS at 08:13

## 2023-01-06 NOTE — PROGRESS NOTES
Problem: Self Care Deficits Care Plan (Adult)  Goal: *Acute Goals and Plan of Care (Insert Text)  Description: FUNCTIONAL STATUS PRIOR TO ADMISSION: Patient was independent and active without use of DME. Per family prior to hospitalization and admission to [facility] she was living alone, cooking for herself, Independent basic ADL's. Per her son she ambulated ~100' with rolling walker at rehab. Per his report she had 3 falls at rehab and COVID while at [facility]: The patient lived alone with her son to provide assistance. Patient's son lives ~50 miles away    Occupational Therapy Goals  Initiated 1/2/2023  1. Patient will perform static standing with bilateral UE support on rolling walker > or = 5 minutes with minimal assistance/contact guard assist within 7 day(s). 2.  Patient will perform upper body dressing with supervision/set-up within 7 day(s). 3.  Patient will perform lower body dressing with moderate assistance and adaptive equipment prn within 7 day(s). 4.  Patient will perform toilet transfers with minimal assistance/contact guard assist using rolling walker within 7 day(s). 5.  Patient will perform all aspects of toileting with moderate assistance  within 7 day(s). Outcome: Progressing Towards Goal   OCCUPATIONAL THERAPY TREATMENT  Patient: Bhumika Schrader (80 y.o. female)  Date: 1/6/2023  Diagnosis: DEBORAH (acute kidney injury) ([facility] [address] [N17.9] <principal problem not specified>      Precautions: Fall  Chart, occupational therapy assessment, plan of care, and goals were reviewed. ASSESSMENT  Patient continues with skilled OT services and is progressing towards goals. Ms. Indio Grimaldo was received in bed agreeable to activity. Patient's son was at bedside for start of tx but stepped out for activity. Patient reports being fearful of falling and presents with B UE tremors associated with anxiety.   She performed bed mobility, multiple stand-pivot transfers to the chair, then to BSC, and back to recliner to remain up at end of tx. She does best with RW use and requires cues for improved technique and safety. Patient engaged in toileting requiring increased assistance for clothing management, and seated grooming tasks with good to fair tolerance. Education provided regarding adaptive ADL techniques and energy conservation techniques. Patient would benefit from continued skilled OT to progress towards goals and improve overall independence. Current Level of Function Impacting Discharge (ADLs): Min A x2, functional mobility; Grooming, setup; LB dressing, total A; Toileting, min-max A         PLAN :  Patient continues to benefit from skilled intervention to address the above impairments. Continue treatment per established plan of care to address goals. Recommend with staff:   Recommend patient be OOB to chair as frequently as tolerated; Goal of 3x/day for all meals for 60 minutes at a time. For toileting needs, recommend transfers to/from [facility] with staff assist.  Encourage patient involvement in personal care as able. Encourage exercises frequently throughout the day. Recommend next OT session: Continue towards set OT goals. Recommendation for discharge: (in order for the patient to meet his/her long term goals)  Therapy up to 5 days/week in SNF setting    This discharge recommendation:  Has been made in collaboration with the attending provider and/or case management    IF patient discharges home will need the following DME: none       SUBJECTIVE:   Patient agreeable to OT tx.     OBJECTIVE DATA SUMMARY:   Cognitive/Behavioral Status:  Neurologic State: Alert;Confused  Orientation Level: Oriented to person;Disoriented to time;Disoriented to situation;Disoriented to place  Cognition: Decreased command following;Decreased attention/concentration;Poor safety awareness  Perception: Cues to maintain midline in sitting;Cues to maintain midline in standing  Perseveration: Perseverates during conversation  Safety/Judgement: Decreased awareness of environment;Decreased insight into deficits    Functional Mobility and Transfers for ADLs:  Bed Mobility:  Rolling: Minimum assistance  Supine to Sit: Minimum assistance;Assist x2; Additional time  Sit to Supine:  (pt remained up at end of tx)  Scooting: Minimum assistance; Additional time    Transfers:  Sit to Stand: Minimum assistance;Assist x2; Additional time  Functional Transfers  Toilet Transfer : Minimum assistance;Assist x2; Additional time  Bed to Chair: Minimum assistance; Additional time    Balance:  Sitting: Impaired  Sitting - Static: Good (unsupported)  Sitting - Dynamic: Fair (occasional)  Standing: Impaired  Standing - Static: Fair  Standing - Dynamic : Fair    ADL Intervention:  Grooming  Grooming Assistance: Set-up  Position Performed: Seated in chair  Washing Face: Set-up (+ applying chapstick)  Washing Hands: Set-up  Cues: Verbal cues provided    Lower Body Dressing Assistance  Dressing Assistance: Total assistance(dependent)  Socks: Total assistance (dependent) (unable to reach distal LE)  Leg Crossed Method Used:  (unable)  Position Performed: Seated edge of bed    Toileting  Toileting Assistance: Maximum assistance  Bladder Hygiene: Minimum assistance  Bowel Hygiene: Minimum assistance  Clothing Management: Maximum assistance  Cues: Verbal cues provided    Cognitive Retraining  Safety/Judgement: Decreased awareness of environment;Decreased insight into deficits    Activity Tolerance:   Good and Fair    After treatment patient left in no apparent distress:   Sitting in chair, Call bell within reach, Bed / chair alarm activated, and Caregiver / family present    COMMUNICATION/COLLABORATION:   The patients plan of care was discussed with: Physical therapist, Registered nurse, and patient .      ANABELL Nunez/L  Time Calculation: 39 mins

## 2023-01-06 NOTE — PROGRESS NOTES
2:13 PM  Message received to call Thuy Bonds at [facility]. They have obtained auth for admission, are able to take patient and are understanding that she may remain covid positive with any testing. Son is in the room and agrees with Abeing able to transfer her in his car. Nursing made aware. 8:52 AM  Message left at [facility] [phone] for Thuy Miller who is in a meeting until 10 am.   Following up from yesterday, as we are working to determine if they can still take patient with the positive COVID test, given that she had COVID 20 days ago, and it is still resulting positive. Message left for Thuy Miller to call this cm back right after the meeting. Transition of Care Plan to SNF/Rehab    SNF/Rehab Transition:  Patient has been accepted to [facility] and meets criteria for admission. Patient will transported by her son in his car and expected to leave as soon as discharge paperwork is ready. Communication to Patient/Family:  Met with patient and son  and they are agreeable to the transition plan. Communication to SNF/Rehab:  Bedside RN, Osiris Bob, has been notified to update the transition plan to the facility and call report (phone number [phone]  Discharge information has been updated on the AVS.         Nursing Please include all hard scripts for controlled substances, med rec and dc summary, and AVS in packet.      Reviewed and confirmed with facility, [facility]  can manage the patient care needs for the following:     Estella Thompson with (X) only those applicable:    Medication:  [x]  Medications will be available at the facility  []  IV Antibiotics   [x]  Controlled Substance - hard copy to be sent with patient   [x]  Weekly Labs   Documents:  [] Hard RX  [x] MAR  [] Kardex  [x] AVS  []Transfer Summary  []Discharge   Equipment:  []  CPAP/BiPAP  []  Wound Vacuum  []  Yost or Urinary Device  []  PICC/Central Line  []  Nebulizer  []  Ventilator   Treatment:  []Isolation (for MRSA, VRE, etc.)  []Surgical Drain Management  []Tracheostomy Care  []Dressing Changes  []Dialysis with transportation and chair time   []PEG Care  []Oxygen  []Daily Weights for Heart Failure   Dietary:  []Any diet limitations  []Tube Feedings   []Total Parenteral Management (TPN)   Eligible for Medicaid Long Term Services and Supports  Yes:  [] Eligible for medical assistance or will become eligible within 180 days and UAI completed. [] Provider/Patient and/or support system has requested screening. [] UAI copy provided to patient or responsible party,   [] UAI unavailable at discharge will send once processed to SNF provider. [] UAI unavailable at discharged mailed to patient  No:   [x] Private pay and is not financially eligible for Medicaid within the next 180 days. [] Reside out-of-state.   [] A residents of a state owned/operated facility that is licensed  by [address] and [employer] or [facility]  [] Enrollment in [facility] hospice services  [] [address]  [] Patient /Family declines to have screening completed or provide financial information for screening     Financial Resources:  Medicaid    [] Initiated and application pending   [] Full coverage     Advanced Care Plan:  []Surrogate Decision Maker of Care  []POA  []Communicated Code Status FULL   Other

## 2023-01-06 NOTE — PROGRESS NOTES
[facility]  [address]  [phone]      Hospitalist Progress Note      NAME: Laron King   :  1930  MRM:  [id number]    Date/Time of service: 2023  12:42 PM       Subjective:     Chief Complaint:  Patient was personally seen and examined by me during this time period. Chart reviewed. Still with weakness. Spoke to patient's son at bedside       Objective:       Vitals:       Last 24hrs VS reviewed since prior progress note. Most recent are:    Visit Vitals  /77 (BP 1 Location: Right upper arm, BP Patient Position: Sitting; At rest)   Pulse 70   Temp 98.2 °F (36.8 °C)   Resp 18   Ht 5' 5\" (1.651 m)   Wt 63.5 kg (140 lb)   SpO2 93%   BMI 23.30 kg/m²     SpO2 Readings from Last 6 Encounters:   23 93%   22 94%   12 100%   12 98%    O2 Flow Rate (L/min): 1 l/min     Intake/Output Summary (Last 24 hours) at 2023 1242  Last data filed at 2023 0845  Gross per 24 hour   Intake 5518 ml   Output --   Net 5518 ml        Exam:     Physical Exam:    Gen:  elderly, ill-appearing, NAD  HEENT:  Pink conjunctivae, PERRL, hearing intact to voice, moist mucous membranes  Neck:  Supple, without masses, thyroid non-tender  Resp:  No accessory muscle use, scant rhonchi   Card:  No murmurs, normal S1, S2 without thrills, bruits or peripheral edema  Abd:  Soft, non-tender, non-distended, normoactive bowel sounds are present  Musc:  No cyanosis or clubbing  Skin:  bruising on ext  Neuro:  Cranial nerves 3-12 are grossly intact, generalized weakness, follows commands appropriately  Psych:  poor insight, oriented to person, place    Medications Reviewed: (see below)    Lab Data Reviewed: (see below)    ______________________________________________________________________    Medications:     Current Facility-Administered Medications   Medication Dose Route Frequency    sodium phosphate 30 mmol in 0.9% sodium chloride 250 mL infusion   IntraVENous ONCE    iron sucrose (VENOFER) injection 200 mg  200 mg IntraVENous DAILY    citalopram (CELEXA) tablet 30 mg  30 mg Oral DAILY    albuterol (ACCUNEB) nebulizer solution 1.25 mg  1.25 mg Nebulization Q6H PRN    acetaminophen (TYLENOL) tablet 650 mg  650 mg Oral Q6H PRN    Or    acetaminophen (TYLENOL) suppository 650 mg  650 mg Rectal Q6H PRN    polyethylene glycol (MIRALAX) packet 17 g  17 g Oral DAILY PRN    ondansetron (ZOFRAN ODT) tablet 4 mg  4 mg Oral Q8H PRN    Or    ondansetron (ZOFRAN) injection 4 mg  4 mg IntraVENous Q6H PRN    enoxaparin (LOVENOX) injection 30 mg  30 mg SubCUTAneous DAILY    donepeziL (ARICEPT) tablet 10 mg  10 mg Oral QHS    levothyroxine (SYNTHROID) tablet 112 mcg  112 mcg Oral ACB          Lab Review:     Recent Labs     01/06/23  0324   WBC 7.0   HGB 7.7*   HCT 24.5*        Recent Labs     01/06/23  0324      K 4.4      CO2 24   GLU 85   BUN 22*   CREA 0.78   CA 7.8*   MG 1.8   PHOS 2.3*     No results found for: GLUCPOC       Assessment / Plan:     79 yo hx of HTN, hypothyroid, dementia, LLE DVT, recent COVID, presented w/ weakness, falls, DEBORAH    1) Weakness/falls: due to dementia. Will cont PT/OT. Awaiting SNF    2) DEBORAH: now resolved. Due to dehydration    3) Acute LLE DVT: high risk for worsening DVT due to immobility. Also has risks with anticoagulation from falls, anemia. Discussed risks vs benefits of anticoagulation with son. Will keep patient on DVT prophylaxis dose of lovenox for now. Would stop all anticoagulation if Hgb drops     4) Iron def anemia: given age, no endoscopy is warranted. Will start IV iron, PPI. Monitor Hgb    5) HTN: BP stable. Off ACEi    6) Hypothyroid: cont synthroid    7) Dementia: cont aricept, celexa     8) Recent COVID infection: not hypoxic. COVID rapid tests still positive.   Cont Vit C/Zinc, guaifenesin     Total time spent with patient care: 35 min  **I personally saw and examined the patient during this time period**                 Care Plan discussed with: Patient, nursing, son    Discussed:  Care Plan    Prophylaxis:  Lovenox    Disposition:  SNF/LTC           ___________________________________________________    Attending Physician: Lisa Mcmahan MD

## 2023-01-06 NOTE — PROGRESS NOTES
Medicare pt has received, reviewed, and signed 2nd IM letter informing them of their right to appeal the discharge. Signed copy has been placed on pt bedside chart.   Duane Favors CMS

## 2023-01-06 NOTE — PROGRESS NOTES
Problem: Mobility Impaired (Adult and Pediatric)  Goal: *Acute Goals and Plan of Care (Insert Text)  Description: FUNCTIONAL STATUS PRIOR TO ADMISSION: Pt was at [facility] for rehab prior to admission. Per family, pt was able to ambulate ~100' with RW at SNF,  had 3 falls and COVID while at [employer]. . Prior to SNF and previous hospital admission, pt was living alone and was IND with mobility and ADL's without use of DME.    HOME SUPPORT PRIOR TO ADMISSION: The patient lived alone with son to provide assistance. Pt's son lives~50 miles away. Physical Therapy Goals  Initiated 1/3/2023  1. Patient will move from supine to sit and sit to supine  in bed with minimal assistance/contact guard assist within 7 day(s). 2.  Patient will transfer from bed to chair and chair to bed with minimal assistance/contact guard assist using the least restrictive device within 7 day(s). 3.  Patient will perform sit to stand with minimal assistance/contact guard assist within 7 day(s). 4.  Patient will ambulate with minimal assistance/contact guard assist for 50 feet with the least restrictive device within 7 day(s). 5.  Patient will participate in lower extremity therapeutic exercise with independence within 7 day(s). Outcome: Progressing Towards Goal   PHYSICAL THERAPY TREATMENT  Patient: Vignesh Corona (80 y.o. female)  Date: 1/6/2023  Diagnosis: DEBORAH (acute kidney injury) ([facility] [N17.9] <principal problem not specified>      Precautions: Fall  Chart, physical therapy assessment, plan of care and goals were reviewed. ASSESSMENT  Patient continues with skilled PT services and is progressing towards goals. Communicated with nurse cleared for therapy. Patient supine on bed when received son in the room at bedside agreed with all goals set for the patient.  Patient appears anxious and pleasantly confuse need to reorient once in a while that she's [facility]. Rolled on the edge of bed min assist, supine to sit min assist, sit to stand min assist, ambulate in the room around the bed towards the recliner min assist. Slow pace gait min no loss of balance sit to stand multiple times assisted to and from bedside commode. OOB to chair as tolerated performed some active range on motion exercise on both LE all planes. Educate and instructed patient to continue active range of motion exercise on both legs while up on chair or on bed multiple times. Recommend patient to be up on the chair at least 3 times a day every meal times as tolerated. Activated chair alarm and notified nurse who agreed to monitor patient. Current Level of Function Impacting Discharge (mobility/balance): min assist with transfers and ambulation using a rolling walker    Other factors to consider for discharge: fall, confuse         PLAN :  Patient continues to benefit from skilled intervention to address the above impairments. Continue treatment per established plan of care. to address goals.     Recommendation for discharge: (in order for the patient to meet his/her long term goals)  Therapy up to 5 days/week in SNF setting    This discharge recommendation:  Has been made in collaboration with the attending provider and/or case management    IF patient discharges home will need the following DME: patient owns DME required for discharge       SUBJECTIVE:   Patient stated Where I am.    OBJECTIVE DATA SUMMARY:   Critical Behavior:  Neurologic State: Anesthetized, Confused  Orientation Level: Oriented to person, Disoriented to place, Disoriented to situation, Disoriented to time  Cognition: Follows commands  Safety/Judgement: Decreased awareness of need for safety, Decreased awareness of need for assistance, Decreased awareness of environment, Decreased insight into deficits  Functional Mobility Training:  Bed Mobility:  Rolling: Minimum assistance  Supine to Sit: Minimum assistance  Sit to Supine: Minimum assistance  Scooting: Minimum assistance        Transfers:  Sit to Stand: Minimum assistance  Stand to Sit: Minimum assistance  Stand Pivot Transfers: Minimum assistance     Bed to Chair: Minimum assistance                    Balance:  Sitting: High guard  Sitting - Static: Good (unsupported)  Sitting - Dynamic: Fair (occasional)  Standing: Impaired; With support  Standing - Static: Fair  Standing - Dynamic : Fair  Ambulation/Gait Training:  Distance (ft): 10 Feet (ft)  Assistive Device: Gait belt  Ambulation - Level of Assistance: Minimal assistance     Gait Description (WDL): Exceptions to WDL  Gait Abnormalities: Path deviations; Step to gait        Base of Support: Narrowed     Speed/Lorna: Fluctuations; Slow  Step Length: Right shortened;Left shortened                      Therapeutic Exercises:   Educate and instructed patient to continue active range of motion exercise on both legs while up on chair or on bed multiple times. Recommend patient to be up on the chair at least 3 times a day every meal times as tolerated. Pain Ratin/10    Activity Tolerance:   Good    After treatment patient left in no apparent distress:   Sitting in chair, Heels elevated for pressure relief, Call bell within reach, Bed / chair alarm activated, and Caregiver / family present    COMMUNICATION/COLLABORATION:   The patients plan of care was discussed with: Occupational therapist, Registered nurse, and Case management. Carey Uribe PT,WCC.    Time Calculation: 45 mins

## 2023-01-06 NOTE — PROGRESS NOTES
Pharmacist Review and Automatic Dose Adjustment of Prophylactic Enoxaparin    *Review reason for admission/hospital problem list*    The reviewing pharmacist has made an adjustment to the ordered enoxaparin dose or converted to UFH per the approved [facility]  protocol and table as identified below. Aguila Balderas is a 80 y.o. female. No lab exists for component: CREATININE    Estimated Creatinine Clearance: 41.4 mL/min (based on SCr of 0.78 mg/dL).     Height:   Ht Readings from Last 1 Encounters:   12/31/22 165.1 cm (65\")     Weight:  Wt Readings from Last 1 Encounters:   12/31/22 63.5 kg (140 lb)               Plan: Based upon the patient's weight and renal function, the ordered enoxaparin dose of 30mg q24h  has been changed/converted to 40 mg q 24 h       Thank you,  BARNEY Fishman

## 2023-01-06 NOTE — DISCHARGE SUMMARY
[facility]  [address] [facility], [place], [id number] [place] [address]  [phone]    Hospitalist Discharge Summary     Patient ID:  Laron King  [id number]  80 y.o.  8/1/1930    Admit date: 12/31/2022    Discharge date and time: 1/6/2023 5:06 PM    Admission Diagnoses: DEBORAH (acute kidney injury) [facility]) [N17.9]    Discharge Diagnoses:  Principal Diagnosis Fall                                            Principal Problem:    Fall (1/1/2023)    Active Problems:    HTN (hypertension) ()      High cholesterol ()      Hypothyroid ()      Thrombocytopenia ([facility] (12/11/2022)      Anemia (12/11/2022)      DEBORAH (acute kidney injury) ([facility] (12/11/2022)      Dehydration (12/11/2022)      Anxiety and depression (12/11/2022)      IBS (irritable bowel syndrome) (12/11/2022)      Debilitated patient (1/1/2023)      Leukocytosis (1/1/2023)      Hypokalemia (1/1/2023)      Hx of deep venous thrombosis (1/1/2023)           Hospital Course:     81 yo hx of HTN, hypothyroid, dementia, LLE DVT, recent COVID, presented w/ weakness, falls, DEBORAH     1) Weakness/falls: due to dementia. Will cont PT/OT. Awaiting SNF     2) DEBORAH: now resolved. Due to dehydration     3) Acute LLE DVT: high risk for worsening DVT due to immobility. Also has risks with anticoagulation from falls, anemia. Discussed risks vs benefits of anticoagulation with son. Will keep patient on DVT prophylaxis dose of lovenox for now as an inpatient. Would stop all anticoagulation if Hgb drops. If worsening DVT, would recommend IVC filter      4) Iron def anemia: given age, no endoscopy is warranted. Will cont IV iron, PPI     5) HTN: BP stable. Off ACEi     6) Hypothyroid: cont synthroid     7) Dementia: cont aricept, celexa      8) Recent COVID infection: not hypoxic. COVID rapid tests still positive.   Cont Vit C/Zinc, guaifenesin     PCP: Jhonny Pacheco MD     Consults: None    Significant Diagnostic Studies: none    Discharge Exam:  Physical Exam:    See daily note    Disposition: SNF  Discharge Condition: Stable    Patient Instructions:   Cannot display discharge medications since this patient is not currently admitted.     Activity: Activity as tolerated  Diet: Regular Diet  Wound Care: As directed    Follow-up with  Follow-up Information       Follow up With Specialties Details Why Contact Info    Caren Orta MD Internal Medicine Physician Schedule an appointment as soon as possible for a visit  As needed [address] [phone]      [facility] Follow up  [facility]             Follow-up tests/labs none    Signed:  Cierra Barton MD  1/6/2023  5:06 PM  **I personally spent 35 min on discharge**

## 2023-01-06 NOTE — PROGRESS NOTES
Bedside shift change report given to Elizabeth (oncoming nurse) by Augusta Yoder (offgoing nurse). Report included the following information SBAR, Intake/Output, MAR, and Recent Results.

## 2023-01-06 NOTE — PROGRESS NOTES
Problem: Falls - Risk of  Goal: *Absence of Falls  Description: Document Clydene Bone Fall Risk and appropriate interventions in the flowsheet. Outcome: Progressing Towards Goal  Note: Fall Risk Interventions:                                Problem: Pressure Injury - Risk of  Goal: *Prevention of pressure injury  Description: Document Roberto Scale and appropriate interventions in the flowsheet.   Outcome: Progressing Towards Goal  Note: Pressure Injury Interventions:  Sensory Interventions: Float heels    Moisture Interventions: Apply protective barrier, creams and emollients    Activity Interventions: Increase time out of bed    Mobility Interventions: Float heels    Nutrition Interventions: Offer support with meals,snacks and hydration    Friction and Shear Interventions: Lift sheet

## 2023-01-09 NOTE — PROGRESS NOTES
Physician Progress Note      PATIENT:               Loza Fuel  CSN #:                  [id number]  :                       1930  ADMIT DATE:       12/10/2022 11:47 AM  DISCH DATE:        2022 11:53 AM  RESPONDING  PROVIDER #:        Maria G Edwards MD          QUERY TEXT:    Good morning. Patient admitted with dyspnea. Noted documentation of Acute Kidney Injury in PNs dated - and DC summary states: \"DEBORAH (acute kidney injury) / Dehydration - POA presume due to poor PO intake. Hydrated, and improved. May have CKD3\". In order to support the diagnosis of DEBORAH, please include additional clinical indicators in your documentation. ? Or please document if the diagnosis of DEBORAH has been ruled out after further study. The medical record reflects the following:    Risk Factors: Dementia, HTN, hyperlipidemia, IBD, thyroid disease, Afib    Clinical Indicators: crea 1.16, 1.09, 1.24, 1.09; BUN 33, 36, 44,34; GFR 44, 48, 41, 48; from H&P \"Monitor renal function with IV Lasix. Hold ACE for now. \"; I&O net:  -4409ml,  -1870ml,  -295ml,  388ml; from DC summary: \"DEBORAH (acute kidney injury) / Dehydration - POA presume due to poor PO intake. Hydrated, and improved. May have CKD3\"    Treatment: serial labs, medication adjustment, I&Os      Thank you,  Anthony Rajput RN, CDI    _____________________________________________________________________________________________________________    Defined by Kidney Disease Improving Global Outcomes (KDIGO) clinical practice guideline for acute kidney injury:  -Increase in SCr by greater than or equal to 0.3 mg/dl within 48 hours; or  -Increase or decrease in SCr to greater than or equal to 1.5 times baseline, which is known or presumed to have occurred within the prior 7 days; or  -Urine volume < 0.5ml/kg/h for 6 hours.   Options provided:  -- Acute kidney injury evidenced by, Please document evidence as well as a numerical baseline creatinine, if known. -- Acute kidney injury ruled out after study  -- Other - I will add my own diagnosis  -- Disagree - Not applicable / Not valid  -- Disagree - Clinically unable to determine / Unknown  -- Refer to Clinical Documentation Reviewer    PROVIDER RESPONSE TEXT:    Acute kidney injury was ruled out after study. Query created by: Zain Julian on 1/9/2023 10:58 AM      QUERY TEXT:    Good morning. Patient was admitted on 12/10 with dyspnea. Noted documentation of PE as possible cause of dyspnea in PN 12/11-12/14 and DC summary. In order to support the diagnosis of PE, please include additional clinical indicators in your documentation. Or please document if the diagnosis of PE has been ruled out after further study. The medical record reflects the following:        Risk Factors: Dementia, HTN, hyperlipidemia, IBS, thyroid disease, Afib    Clinical Indicators: d-dimer 18.72; NM lung scan- \"Intermediate probability for pulmonary embolism. \"; from DC summary \"Acute deep venous thrombosis / Elevated d-dimer - POA, new, no hemodynamic compromise or hypoxia. US finds one small DVT in L peroneal vein. VQ scan was intermediate probability. However she has iron deficient anemia and is hemoccult positive. I would not recommend aggressive treatment. He son concurs with plan to not start treatment\"; Treatment: labs, NM lung scan        Thank you,  María Lazo RN, CDI  Options provided:  -- PE present as evidenced by, Please document evidence.   -- PE was ruled out  -- Other - I will add my own diagnosis  -- Disagree - Not applicable / Not valid  -- Disagree - Clinically unable to determine / Unknown  -- Refer to Clinical Documentation Reviewer    PROVIDER RESPONSE TEXT:    PE is present as evidenced by VQ and DDimer    Query created by: Zain Julian on 1/9/2023 10:59 AM      Electronically signed by:  Jillian Gonzalez MD 1/9/2023 2:59 PM

## 2023-01-13 NOTE — PROGRESS NOTES
Physician Progress Note      PATIENT:               Fiona DAILY #:                  [id number]  :                       1930  ADMIT DATE:       2022 9:05 PM  [address] DATE:        2023 4:00 PM  RESPONDING  PROVIDER #:        Ondina Tapia DO, MD          QUERY TEXT:    Good Morning,    This patient admitted on 2022-2023--for Acute Renal Failure. The medical record notes, \"Recent COVID infection\". COVID Rapid test this admission is \"still positive\"    If possible,  can you please clarify the COVID infection for this admission and please document in progress notes and discharge summary if patient has an active Covid-19 infection or if patient is testing positive for Covid-19 without a current active infection: The medical record reflects the following:  Risk Factors: Recent history of COVID 19, Rapid COVID here is positive. Age 80, SNF, Dementia  Clinical Indicators: *Pt presented with c/o poor po intake, Dehydration, hypokalemia, DEBORAH. Documented recent COVID 19 infections. Rapid COVID on 2023 is Positive, pt is not hypoxic o2 sats on RA 94-97%  Treatment: Rapid COVID test on 2023- o2 sat monitoring, Vitamin C. Zinc, Guaifenesin    Thank you for clarifying  Dalia Meneses, JODIN,RN, CPHQ, CCDS, SMART  Options provided:  -- Active Covid-19 infection is being treated and/or evaluated on current encounter  -- Positive Covid test result without an active current Covid-19 infection. History of COVID only  -- Positive Covid test result without an active current Covid-19 infection. This is a Post COVID condition  -- Other - I will add my own diagnosis  -- Disagree - Not applicable / Not valid  -- Disagree - Clinically unable to determine / Unknown  -- Refer to Clinical Documentation Reviewer    PROVIDER RESPONSE TEXT:    Patient is testing positive for Covid without an active Covid-19 infection. This is a Post COVID condition.     Query created by: Rashawn Dsouza on 1/10/2023 8:35 AM      Electronically signed by:  Farida Jones MD 1/12/2023 8:19 PM

## 2023-01-31 PROBLEM — E86.0 DEHYDRATION: Status: RESOLVED | Noted: 2022-12-11 | Resolved: 2023-01-31
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
## 2025-04-04 NOTE — PROGRESS NOTES
Bedside shift change report given to Morgan Soria LPN (oncoming nurse) by Aurelio Morrow RN (offgoing nurse). Report included the following information SBAR, Kardex, ED Summary, Intake/Output, and MAR. Bactrim Pregnancy And Lactation Text: This medication is Pregnancy Category D and is known to cause fetal risk.  It is also excreted in breast milk. Tazorac Pregnancy And Lactation Text: This medication is not safe during pregnancy. It is unknown if this medication is excreted in breast milk. Erythromycin Counseling:  I discussed with the patient the risks of erythromycin including but not limited to GI upset, allergic reaction, drug rash, diarrhea, increase in liver enzymes, and yeast infections. Detail Level: Detailed High Dose Vitamin A Pregnancy And Lactation Text: High dose vitamin A therapy is contraindicated during pregnancy and breast feeding. Azithromycin Pregnancy And Lactation Text: This medication is considered safe during pregnancy and is also secreted in breast milk. Doxycycline Counseling:  Patient counseled regarding possible photosensitivity and increased risk for sunburn.  Patient instructed to avoid sunlight, if possible.  When exposed to sunlight, patients should wear protective clothing, sunglasses, and sunscreen.  The patient was instructed to call the office immediately if the following severe adverse effects occur:  hearing changes, easy bruising/bleeding, severe headache, or vision changes.  The patient verbalized understanding of the proper use and possible adverse effects of doxycycline.  All of the patient's questions and concerns were addressed. Winlevi Counseling:  I discussed with the patient the risks of topical clascoterone including but not limited to erythema, scaling, itching, and stinging. Patient voiced their understanding. Aklief counseling:  Patient advised to apply a pea-sized amount only at bedtime and wait 30 minutes after washing their face before applying.  If too drying, patient may add a non-comedogenic moisturizer.  The most commonly reported side effects including irritation, redness, scaling, dryness, stinging, burning, itching, and increased risk of sunburn.  The patient verbalized understanding of the proper use and possible adverse effects of retinoids.  All of the patient's questions and concerns were addressed. Include Pregnancy/Lactation Warning?: No Topical Retinoid Pregnancy And Lactation Text: This medication is Pregnancy Category C. It is unknown if this medication is excreted in breast milk. Minocycline Pregnancy And Lactation Text: This medication is Pregnancy Category D and not consider safe during pregnancy. It is also excreted in breast milk. Tetracycline Counseling: Patient counseled regarding possible photosensitivity and increased risk for sunburn.  Patient instructed to avoid sunlight, if possible.  When exposed to sunlight, patients should wear protective clothing, sunglasses, and sunscreen.  The patient was instructed to call the office immediately if the following severe adverse effects occur:  hearing changes, easy bruising/bleeding, severe headache, or vision changes.  The patient verbalized understanding of the proper use and possible adverse effects of tetracycline.  All of the patient's questions and concerns were addressed. Patient understands to avoid pregnancy while on therapy due to potential birth defects. Benzoyl Peroxide Pregnancy And Lactation Text: This medication is Pregnancy Category C. It is unknown if benzoyl peroxide is excreted in breast milk. Topical Sulfur Applications Counseling: Topical Sulfur Counseling: Patient counseled that this medication may cause skin irritation or allergic reactions.  In the event of skin irritation, the patient was advised to reduce the amount of the drug applied or use it less frequently.   The patient verbalized understanding of the proper use and possible adverse effects of topical sulfur application.  All of the patient's questions and concerns were addressed. Birth Control Pills Counseling: Birth Control Pill Counseling: I discussed with the patient the potential side effects of OCPs including but not limited to increased risk of stroke, heart attack, thrombophlebitis, deep venous thrombosis, hepatic adenomas, breast changes, GI upset, headaches, and depression.  The patient verbalized understanding of the proper use and possible adverse effects of OCPs. All of the patient's questions and concerns were addressed. Azelaic Acid Pregnancy And Lactation Text: This medication is considered safe during pregnancy and breast feeding. Topical Clindamycin Counseling: Patient counseled that this medication may cause skin irritation or allergic reactions.  In the event of skin irritation, the patient was advised to reduce the amount of the drug applied or use it less frequently.   The patient verbalized understanding of the proper use and possible adverse effects of clindamycin.  All of the patient's questions and concerns were addressed. Spironolactone Counseling: Patient advised regarding risks of diarrhea, abdominal pain, hyperkalemia, birth defects (for female patients), liver toxicity and renal toxicity. The patient may need blood work to monitor liver and kidney function and potassium levels while on therapy. The patient verbalized understanding of the proper use and possible adverse effects of spironolactone.  All of the patient's questions and concerns were addressed. Isotretinoin Pregnancy And Lactation Text: This medication is Pregnancy Category X and is considered extremely dangerous during pregnancy. It is unknown if it is excreted in breast milk. Dapsone Counseling: I discussed with the patient the risks of dapsone including but not limited to hemolytic anemia, agranulocytosis, rashes, methemoglobinemia, kidney failure, peripheral neuropathy, headaches, GI upset, and liver toxicity.  Patients who start dapsone require monitoring including baseline LFTs and weekly CBCs for the first month, then every month thereafter.  The patient verbalized understanding of the proper use and possible adverse effects of dapsone.  All of the patient's questions and concerns were addressed. Sarecycline Counseling: Patient advised regarding possible photosensitivity and discoloration of the teeth, skin, lips, tongue and gums.  Patient instructed to avoid sunlight, if possible.  When exposed to sunlight, patients should wear protective clothing, sunglasses, and sunscreen.  The patient was instructed to call the office immediately if the following severe adverse effects occur:  hearing changes, easy bruising/bleeding, severe headache, or vision changes.  The patient verbalized understanding of the proper use and possible adverse effects of sarecycline.  All of the patient's questions and concerns were addressed. Erythromycin Pregnancy And Lactation Text: This medication is Pregnancy Category B and is considered safe during pregnancy. It is also excreted in breast milk. Aklief Pregnancy And Lactation Text: It is unknown if this medication is safe to use during pregnancy.  It is unknown if this medication is excreted in breast milk.  Breastfeeding women should use the topical cream on the smallest area of the skin for the shortest time needed while breastfeeding.  Do not apply to nipple and areola. Tazorac Counseling:  Patient advised that medication is irritating and drying.  Patient may need to apply sparingly and wash off after an hour before eventually leaving it on overnight.  The patient verbalized understanding of the proper use and possible adverse effects of tazorac.  All of the patient's questions and concerns were addressed. Azithromycin Counseling:  I discussed with the patient the risks of azithromycin including but not limited to GI upset, allergic reaction, drug rash, diarrhea, and yeast infections. Topical Retinoid counseling:  Patient advised to apply a pea-sized amount only at bedtime and wait 30 minutes after washing their face before applying.  If too drying, patient may add a non-comedogenic moisturizer. The patient verbalized understanding of the proper use and possible adverse effects of retinoids.  All of the patient's questions and concerns were addressed. Topical Sulfur Applications Pregnancy And Lactation Text: This medication is Pregnancy Category C and has an unknown safety profile during pregnancy. It is unknown if this topical medication is excreted in breast milk. Bactrim Counseling:  I discussed with the patient the risks of sulfa antibiotics including but not limited to GI upset, allergic reaction, drug rash, diarrhea, dizziness, photosensitivity, and yeast infections.  Rarely, more serious reactions can occur including but not limited to aplastic anemia, agranulocytosis, methemoglobinemia, blood dyscrasias, liver or kidney failure, lung infiltrates or desquamative/blistering drug rashes. Doxycycline Pregnancy And Lactation Text: This medication is Pregnancy Category D and not consider safe during pregnancy. It is also excreted in breast milk but is considered safe for shorter treatment courses. Minocycline Counseling: Patient advised regarding possible photosensitivity and discoloration of the teeth, skin, lips, tongue and gums.  Patient instructed to avoid sunlight, if possible.  When exposed to sunlight, patients should wear protective clothing, sunglasses, and sunscreen.  The patient was instructed to call the office immediately if the following severe adverse effects occur:  hearing changes, easy bruising/bleeding, severe headache, or vision changes.  The patient verbalized understanding of the proper use and possible adverse effects of minocycline.  All of the patient's questions and concerns were addressed. Winlevi Pregnancy And Lactation Text: This medication is considered safe during pregnancy and breastfeeding. Spironolactone Pregnancy And Lactation Text: This medication can cause feminization of the male fetus and should be avoided during pregnancy. The active metabolite is also found in breast milk. Dapsone Pregnancy And Lactation Text: This medication is Pregnancy Category C and is not considered safe during pregnancy or breast feeding. High Dose Vitamin A Counseling: Side effects reviewed, pt to contact office should one occur. Azelaic Acid Counseling: Patient counseled that medicine may cause skin irritation and to avoid applying near the eyes.  In the event of skin irritation, the patient was advised to reduce the amount of the drug applied or use it less frequently.   The patient verbalized understanding of the proper use and possible adverse effects of azelaic acid.  All of the patient's questions and concerns were addressed. Isotretinoin Counseling: Patient should get monthly blood tests, not donate blood, not drive at night if vision affected, not share medication, and not undergo elective surgery for 6 months after tx completed. Side effects reviewed, pt to contact office should one occur. Birth Control Pills Pregnancy And Lactation Text: This medication should be avoided if pregnant and for the first 30 days post-partum. Topical Clindamycin Pregnancy And Lactation Text: This medication is Pregnancy Category B and is considered safe during pregnancy. It is unknown if it is excreted in breast milk. Benzoyl Peroxide Counseling: Patient counseled that medicine may cause skin irritation and bleach clothing.  In the event of skin irritation, the patient was advised to reduce the amount of the drug applied or use it less frequently.   The patient verbalized understanding of the proper use and possible adverse effects of benzoyl peroxide.  All of the patient's questions and concerns were addressed.